# Patient Record
Sex: MALE
[De-identification: names, ages, dates, MRNs, and addresses within clinical notes are randomized per-mention and may not be internally consistent; named-entity substitution may affect disease eponyms.]

---

## 2017-05-09 ENCOUNTER — HOSPITAL ENCOUNTER (EMERGENCY)
Dept: HOSPITAL 14 - H.ER | Age: 57
Discharge: HOME | End: 2017-05-09
Payer: MEDICARE

## 2017-05-09 VITALS — OXYGEN SATURATION: 100 % | TEMPERATURE: 97 F

## 2017-05-09 VITALS — HEART RATE: 64 BPM | SYSTOLIC BLOOD PRESSURE: 124 MMHG | RESPIRATION RATE: 18 BRPM | DIASTOLIC BLOOD PRESSURE: 85 MMHG

## 2017-05-09 VITALS — BODY MASS INDEX: 26.9 KG/M2

## 2017-05-09 DIAGNOSIS — Z21: ICD-10-CM

## 2017-05-09 DIAGNOSIS — I10: ICD-10-CM

## 2017-05-09 DIAGNOSIS — N39.0: Primary | ICD-10-CM

## 2017-05-09 LAB
ALBUMIN/GLOB SERPL: 1.2 {RATIO} (ref 1–2.1)
ALP SERPL-CCNC: 83 U/L (ref 38–126)
ALT SERPL-CCNC: 26 U/L (ref 21–72)
AST SERPL-CCNC: 43 U/L (ref 17–59)
BASOPHILS # BLD AUTO: 0 K/UL (ref 0–0.2)
BASOPHILS NFR BLD: 0.7 % (ref 0–2)
BILIRUB SERPL-MCNC: 4.4 MG/DL (ref 0.2–1.3)
BILIRUB UR-MCNC: NEGATIVE MG/DL
BUN SERPL-MCNC: 9 MG/DL (ref 9–20)
CALCIUM SERPL-MCNC: 9.2 MG/DL (ref 8.4–10.2)
CHLORIDE SERPL-SCNC: 102 MMOL/L (ref 98–107)
CO2 SERPL-SCNC: 23 MMOL/L (ref 22–30)
COLOR UR: (no result)
EOSINOPHIL # BLD AUTO: 0.1 K/UL (ref 0–0.7)
EOSINOPHIL NFR BLD: 2.9 % (ref 0–4)
ERYTHROCYTE [DISTWIDTH] IN BLOOD BY AUTOMATED COUNT: 12.1 % (ref 11.5–14.5)
GLOBULIN SER-MCNC: 4 GM/DL (ref 2.2–3.9)
GLUCOSE SERPL-MCNC: 92 MG/DL (ref 75–110)
GLUCOSE UR STRIP-MCNC: (no result) MG/DL
HCT VFR BLD CALC: 41.1 % (ref 35–51)
KETONES UR STRIP-MCNC: NEGATIVE MG/DL
LEUKOCYTE ESTERASE UR-ACNC: (no result) LEU/UL
LYMPHOCYTES # BLD AUTO: 1 K/UL (ref 1–4.3)
LYMPHOCYTES NFR BLD AUTO: 31.8 % (ref 20–40)
MCH RBC QN AUTO: 33.2 PG (ref 27–31)
MCHC RBC AUTO-ENTMCNC: 33.9 G/DL (ref 33–37)
MCV RBC AUTO: 97.7 FL (ref 80–94)
MONOCYTES # BLD: 0.1 K/UL (ref 0–0.8)
MONOCYTES NFR BLD: 4.4 % (ref 0–10)
NEUTROPHILS # BLD: 1.9 K/UL (ref 1.8–7)
NEUTROPHILS NFR BLD AUTO: 60.2 % (ref 50–75)
NRBC BLD AUTO-RTO: 0.2 % (ref 0–0)
PH UR STRIP: 6 [PH] (ref 5–8)
PLATELET # BLD: 156 K/UL (ref 130–400)
PMV BLD AUTO: 9.8 FL (ref 7.2–11.7)
POTASSIUM SERPL-SCNC: 4.6 MMOL/L (ref 3.6–5)
PROT SERPL-MCNC: 8.9 G/DL (ref 6.3–8.2)
PROT UR STRIP-MCNC: NEGATIVE MG/DL
RBC # UR STRIP: NEGATIVE /UL
RBC #/AREA URNS HPF: 1 /HPF (ref 0–3)
SODIUM SERPL-SCNC: 139 MMOL/L (ref 132–148)
SP GR UR STRIP: < 1.005 (ref 1–1.03)
UROBILINOGEN UR-MCNC: (no result) MG/DL (ref 0.2–1)
WBC # BLD AUTO: 3.2 K/UL (ref 4.8–10.8)
WBC #/AREA URNS HPF: < 1 /HPF (ref 0–5)

## 2017-05-09 PROCEDURE — 80053 COMPREHEN METABOLIC PANEL: CPT

## 2017-05-09 PROCEDURE — 99283 EMERGENCY DEPT VISIT LOW MDM: CPT

## 2017-05-09 PROCEDURE — 81003 URINALYSIS AUTO W/O SCOPE: CPT

## 2017-05-09 PROCEDURE — 96360 HYDRATION IV INFUSION INIT: CPT

## 2017-05-09 PROCEDURE — 74177 CT ABD & PELVIS W/CONTRAST: CPT

## 2017-05-09 PROCEDURE — 87086 URINE CULTURE/COLONY COUNT: CPT

## 2017-05-09 PROCEDURE — 83605 ASSAY OF LACTIC ACID: CPT

## 2017-05-09 PROCEDURE — 87040 BLOOD CULTURE FOR BACTERIA: CPT

## 2017-05-09 PROCEDURE — 85025 COMPLETE CBC W/AUTO DIFF WBC: CPT

## 2017-05-23 ENCOUNTER — HOSPITAL ENCOUNTER (INPATIENT)
Dept: HOSPITAL 31 - C.ER | Age: 57
LOS: 3 days | Discharge: HOME | DRG: 341 | End: 2017-05-26
Attending: INTERNAL MEDICINE | Admitting: INTERNAL MEDICINE
Payer: MEDICARE

## 2017-05-23 VITALS — BODY MASS INDEX: 26.9 KG/M2

## 2017-05-23 DIAGNOSIS — E27.40: ICD-10-CM

## 2017-05-23 DIAGNOSIS — K35.80: Primary | ICD-10-CM

## 2017-05-23 DIAGNOSIS — B18.2: ICD-10-CM

## 2017-05-23 DIAGNOSIS — K21.9: ICD-10-CM

## 2017-05-23 DIAGNOSIS — B20: ICD-10-CM

## 2017-05-23 DIAGNOSIS — K66.0: ICD-10-CM

## 2017-05-23 DIAGNOSIS — B18.1: ICD-10-CM

## 2017-05-23 DIAGNOSIS — H30.893: ICD-10-CM

## 2017-05-23 DIAGNOSIS — B25.8: ICD-10-CM

## 2017-05-23 LAB
ALBUMIN/GLOB SERPL: 1.2 {RATIO} (ref 1–2.1)
ALP SERPL-CCNC: 126 U/L (ref 38–126)
ALT SERPL-CCNC: 25 U/L (ref 21–72)
APTT BLD: 32 SECONDS (ref 21–34)
AST SERPL-CCNC: 31 U/L (ref 17–59)
BASOPHILS # BLD AUTO: 0 K/UL (ref 0–0.2)
BASOPHILS NFR BLD: 0.7 % (ref 0–2)
BILIRUB SERPL-MCNC: 1.7 MG/DL (ref 0.2–1.3)
BILIRUB UR-MCNC: NEGATIVE MG/DL
BUN SERPL-MCNC: 13 MG/DL (ref 9–20)
CALCIUM SERPL-MCNC: 8.6 MG/DL (ref 8.6–10.4)
CHLORIDE SERPL-SCNC: 103 MMOL/L (ref 98–107)
CO2 SERPL-SCNC: 26 MMOL/L (ref 22–30)
EOSINOPHIL # BLD AUTO: 0.1 K/UL (ref 0–0.7)
EOSINOPHIL NFR BLD: 2.7 % (ref 0–4)
ERYTHROCYTE [DISTWIDTH] IN BLOOD BY AUTOMATED COUNT: 12 % (ref 11.5–14.5)
GLOBULIN SER-MCNC: 3.5 GM/DL (ref 2.2–3.9)
GLUCOSE SERPL-MCNC: 109 MG/DL (ref 75–110)
GLUCOSE UR STRIP-MCNC: NORMAL MG/DL
HCT VFR BLD CALC: 36.3 % (ref 35–51)
INR PPP: 1.1
KETONES UR STRIP-MCNC: NEGATIVE MG/DL
LEUKOCYTE ESTERASE UR-ACNC: (no result) LEU/UL
LYMPHOCYTES # BLD AUTO: 1.2 K/UL (ref 1–4.3)
LYMPHOCYTES NFR BLD AUTO: 34.6 % (ref 20–40)
MCH RBC QN AUTO: 33.6 PG (ref 27–31)
MCHC RBC AUTO-ENTMCNC: 34.7 G/DL (ref 33–37)
MCV RBC AUTO: 96.7 FL (ref 80–94)
MONOCYTES # BLD: 0.1 K/UL (ref 0–0.8)
MONOCYTES NFR BLD: 3.1 % (ref 0–10)
NRBC BLD AUTO-RTO: 0 % (ref 0–2)
PH UR STRIP: 5 [PH] (ref 5–8)
PLATELET # BLD: 155 K/UL (ref 130–400)
PMV BLD AUTO: 9.6 FL (ref 7.2–11.7)
POTASSIUM SERPL-SCNC: 3.6 MMOL/L (ref 3.6–5.2)
PROT SERPL-MCNC: 7.7 G/DL (ref 6.3–8.3)
PROT UR STRIP-MCNC: NEGATIVE MG/DL
RBC # UR STRIP: NEGATIVE /UL
SODIUM SERPL-SCNC: 141 MMOL/L (ref 132–148)
SP GR UR STRIP: 1.01 (ref 1–1.03)
UROBILINOGEN UR-MCNC: NORMAL MG/DL (ref 0.2–1)
WBC # BLD AUTO: 3.6 K/UL (ref 4.8–10.8)
WBC #/AREA URNS HPF: < 1 /HPF (ref 0–5)

## 2017-05-23 NOTE — C.PDOC
History Of Present Illness





A 57 y/o male presents to the ER c/o RLQ pain for 2 weeks. Pt notes the pain 

has been worse over the past few days. Pt was seen at [facility] about 2 

weeks prior and was given Cipro. Pt denies fever, chills, nausea, vomiting, 

trauma to the area, constipation, or any other complaints.





Time Seen by Provider: 05/23/17 19:33


Chief Complaint (Nursing): Abdominal Pain


History Per: Patient


History/Exam Limitations: no limitations


Onset/Duration Of Symptoms: Days


Current Symptoms Are (Timing): Still Present


Context: Other


Severity: Mild


Location Of Pain/Discomfort: RLQ


Radiation Of Pain To:: None


Quality Of Discomfort: Dull, Cramping, Stabbing


Associated Symptoms: denies: Fever, Chills, Nausea, Vomiting, Diarrhea


Exacerbating Factors: None


Alleviating Factors: None


Recent travel outside of the [place]: No


Additional History Per: Patient





Past Medical History


Reviewed: Historical Data, Nursing Documentation, Vital Signs


Vital Signs: 


 Last Vital Signs











Temp  97.9 F   05/23/17 20:11


 


Pulse  67   05/23/17 20:11


 


Resp  16   05/23/17 20:11


 


BP  120/76   05/23/17 20:11


 


Pulse Ox  98   05/23/17 21:53














- Medical History


PMH: Gastritis (EGD 4/16 showed Candida, GERD and Gastritis), Gall Bladder 

Disease, HIV


   Denies: Crohn's Disease, Diverticulitis, Pancreatitis, Chronic Kidney Disease


Surgical History: Cholecystectomy, Endoscopy





- [facility] Procedures








COLONOSCOPY (12/03/14)


DRAINAGE OF RIGHT LOWER LOBE BRONCHUS, ENDO, DIAGN (08/03/16)








Family History: States: Unknown Family Hx





- Social History


Hx Tobacco Use: No


Hx Alcohol Use: No


Hx Substance Use: No





- Immunization History


Hx Tetanus Toxoid Vaccination: Yes


Hx Influenza Vaccination: Yes


Hx Pneumococcal Vaccination: Yes





Review Of Systems


Constitutional: Negative for: Fever, Chills, Other ([place])


Eyes: Negative for: Redness


Cardiovascular: Negative for: Chest Pain


Respiratory: Negative for: Shortness of Breath


Gastrointestinal: Positive for: Abdominal Pain (RLQ 2 weeks).  Negative for: 

Nausea, Vomiting, Diarrhea, Constipation


Genitourinary: Positive for: Dysuria, Frequency


Musculoskeletal: Negative for: Back Pain


Skin: Negative for: Rash, Lesions, Jaundice, Bruising


Neurological: Negative for: Weakness


Psych: Negative for: Anxiety





Physical Exam





- Physical Exam


Appears: Non-toxic, No Acute Distress


Skin: Warm, Dry


Head: Normacephalic


Eye(s): bilateral: Normal Inspection


Oral Mucosa: Moist


Neck: Trachea Midline, Supple


Chest: Symmetrical


Cardiovascular: Rhythm Regular


Respiratory: No Rales, No Rhonchi, No Wheezing


Gastrointestinal/Abdominal: Soft, Tenderness (RLQ tenderness), No Guarding, No 

Rebound


Back: Normal Inspection


Male Genital: No Testicular Tenderness, No Testicular Swelling, No Inguinal 

Tenderness, No Inguinal Swelling, Circumcised, Other (Inguinal canal normal)


Extremity: Normal ROM


Extremity: Bilateral: Atraumatic, Normal Color And Temperature, Normal ROM


Neurological/Psych: Oriented x3, Normal Speech, Normal Cognition, Other (No 

focal deficit)


Gait: Steady





ED Course And Treatment





- Laboratory Results


Result Diagrams: 


 05/23/17 20:06





 05/23/17 20:06


O2 Sat by Pulse Oximetry: 98 (RA)


Pulse Ox Interpretation: Normal





- CT Scan/US


  ** CT Abd/Pel


Other Rad Studies (CT/US): Interpreted By Me, Read By Radiologist


CT/US Interpretation: EXAM:  CT Abdomen and Pelvis With Intravenous Contrast.  

CLINICAL HISTORY:  56 years old, male; Pain; Abdominal pain; Localized; Right 

lower quadrant (rlq); Additional info: Rlq.  pain.  TECHNIQUE:  Axial computed 

tomography images of the abdomen and pelvis with intravenous contrast. This CT.

  exam was performed using one or more of the following dose reduction 

techniques: automated.  exposure control, adjustment of the mA and/or kV 

according to patient size, and/or use of iterative.  reconstruction technique.  

Coronal and sagittal reformatted images were created and reviewed.  CONTRAST:  

100 mL of omnipaque 300 administered intravenously.  EXAM DATE/TIME:  Exam 

ordered 5/23/2017 7:39 PM.  COMPARISON:  US - PELVIS ULTRASOUND 1/5/2016 2:22:

56 PM.  FINDINGS:  Lower thorax: Coarse reticular pleural based densities are 

noted in the right middle lobe.  ABDOMEN:  Liver: A clip is noted adjacent to 

the posterior margin of the liver.  Gallbladder and bile ducts: Surgical clips 

are noted in the gallbladder fossa. The gallbladder is.  absent. No ductal 

dilation.  Pancreas: Unremarkable. No mass. No ductal dilation.  Spleen: 

Unremarkable. No splenomegaly.  Adrenals: Unremarkable. No mass.  Kidneys and 

ureters: Unremarkable. No solid mass. No hydronephrosis.  Stomach and bowel: 

Unremarkable. No obstruction. No mucosal thickening.  Appendix: There is mild 

thickening of the tip of the appendix with minimal periappendiceal.  

inflammatory change.  PELVIS:  Bladder: Unremarkable. No mass.  Reproductive: 

Prostate measures 4.1 x 4.7 x 4.4 cm.  ABDOMEN and PELVIS:  Intraperitoneal 

space: Unremarkable. No free air. No significant fluid collection.  Bones/joints

: Degenerative changes are noted of the lumbar spine there is a grade 1.  

spondylolisthesis at the lumbosacral junction with 4 mm of anterolisthesis of 

S1 with respect to L5.  There is straightening of the normal lumbar lordosis. 

No acute fracture. No dislocation.  Soft tissues: Unremarkable.  Vasculature: 

Unremarkable. No abdominal aortic aneurysm.  Lymph nodes: Unremarkable. No 

enlarged lymph nodes.  IMPRESSION:  1. Appendicitis. No abscess. No perforation.


Progress Note: spoke with dr hernandez. for surgery in am





Disposition


Discussed With .: Darius Travis


Comment: accepted the pt on his service and took over the care at 9:46 PM


Doctor Will See Patient In The: Hospital


Counseled Patient/Family Regarding: Studies Performed, Diagnosis





- Disposition


Disposition: HOSPITALIZED


Disposition Time: 19:33


Condition: FAIR





- POA


Present On Arrival: None





- Clinical Impression


Clinical Impression: 


 Abdominal pain, Appendicitis








- Scribe Statement


The provider has reviewed the documentation as recorded by the Scribe





Nikos porter





All medical record entries made by the Clarisaibtrevin were at my direction and 

personally dictated by me. I have reviewed the chart and agree that the record 

accurately reflects my personal performance of the history, physical exam, 

medical decision making, and the department course for this patient. I have 

also personally directed, reviewed, and agree with the discharge instructions 

and disposition.





Decision To Admit





- Pt Status Changed To:


Hospital Disposition Of: Inpatient





- Admit Certification


Admit to Inpatient:: After my assessment, the patient will require 

hospitalization for at least two midnights.  This is because of the severity of 

symptoms shown, intensity of services needed, and/or the medical risk in this 

patient being treated as an outpatient.





- InPatient:


Physician Admission Certification:: After my assessment, the patient will 

require hospitalization for at least two midnights.  This is because of the 

severity of symptoms shown, intensity of services needed, and/or the medical 

risk in this patient being treated as an outpatient.





- .


Bed Request Type: Regular


Admitting Physician: Darius Travis (')


Patient Diagnosis: 


 Abdominal pain, Appendicitis

## 2017-05-23 NOTE — CP.PCM.HP
History of Present Illness





- History of Present Illness


History of Present Illness: 





COMPREHENSIVE   HISTORY & PHYSICAL EXAM 


   


HPI


FOR FEW DAYS C/O OF R. LQ PAIN A/W FEVER CHILLS AND NAUSEA. WAS FIRST EVALUATED 

IN ER [facility] AND SENT HOME . PAIN PESISTED AND REPAET CT ABD SHOWED SWOLLEN 

ROSARIO APPENDIX AND INFLAMMATION 





PAST HIST.


HIV/CMV RETINITIS/ADRENAL FAILURE /HTN /


PERSONAL HIST:   Smoking.   N   Alcohol.   N      Allergy N            Travel_-

      .


FAMILY HIST : 


ROS :


Constitutional: Negative for weight change, chills, night sweats, fatigue and 

usage of assist device. 


  Eyes: Negative for redness, swelling, itching, discharge, vision changes, 

blurry vision, double vision, glaucoma, cataracts, 


  Ears: Negative for hearing loss, ringing, , tinnitus, vertigo


 Nose: Negative for rhinorrhea, stuffiness, sniffing, itching, postnasal drip, 

discoloration, nasal congestion and epistaxis. 


 Throat: Negative for throat clearing, sore throat, hoarseness, difficulty 

swallowing and difficulty speaking. 


  Respiratory: Negative for cough, chest tightness, sputum or phlegm, chronic 

cough, hemoptysis, wheezing, snoring at night, pleuritic chest pain and daytime 

somnolence. 


 Cardiovascular: Negative for chest pain, palpitations, orthopnea, PND, Edema 

of legs, leg cramps, angina, claudication, , irregular heartbeat,


 Neurology: Negative for irritability, muscle weakness, numbness and tingling, 

seizures, tremors, migraines, slurred speech, syncope, memory loss, mood changes

, recurrent headaches 


Gastrointestinal: Negative for difficulty swallowing, diarrhea, constipation, 

black stools, rectal bleeding, , flatulence, reflux POS  RLQ PAIN  AND NUSEA 


  Genitourinary: Negative for frequent urination, hematuria, discharge, 

incontinence, urinary retention, frequent UTI, Psychiatric: Negative for 

depression, anxiety/panic, suicidal tendencies, 


Musculoskeletal: Negative for swollen joints, back pain, , neck pain, morning 

stiffness of joints, . 


 Skin: Negative for rash, ulcers, itching, dry skin and pigmented lesions.


P/E: 


  Constitutional: Appears stated age and in no apparent distress. 


 Head: Normocephalic. 


  Ears: External ear canals patent without inflammation. Tympanic membranes 

intact with normal light reflex and landmark. 


  Eyes: Pupils are central, bilaterally equal, symmetrical and reacts to light 

with normal movements and no icterus or pallor. 


 Nose: External nares are patent. Mucosa is pink  Mouth-Throat: Good general 

appearance and condition. No post-pharyngeal/oropharyngeal erythema and 

tonsillar hypertrophy. Good dental hygiene. 


  Neck-Lymphatic: Neck is supple with normal ROM, no thyromegaly, lymph nodes 

or masses. JVD is normal with no carotid bruit. 


  Lungs: Clear to percussion and auscultation with bilateral normal air entry. 


  Cardiovascular: S1 and S2 are normal with no murmurs, gallops and rub. 


  GI Exam: No hepatomegaly. Abdomen is soft and   RLQ tender. No Organomegaly , 

masses or hernias are evident and bowel sounds are normal and active. 


  Neurology: Higher function and all cranial nerves intact, with no gross motor 

or sensory deficit. Superficial and deep reflexes are normal with downwards 

planters. No cerebellar deficit with normal gait. 


  Musculoskeletal: No tender spots with normal curvature of the spine with no 

swelling or restricted ROM of the small and large joints. 


  Extremities: Homans sign absent. Intact pulses with no pitting edema, calf 

tenderness or skin color changes. 


  Skin: No rash, eruptions or abnormal skin pigmentation





LAB/RADIOLOGY:


ASSESMENT :


ACUTE APPENDICITIES 


HIV 


H/O  CMV RETINITIS 


ADRENAL FAILURE 





PLAN: 


SURGICAL/ID EVAL 








Present on Admission





- Present on Admission


Any Indicators Present on Admission: No





Past Patient History





- Infectious Disease


Hx of Infectious Diseases: None





- Past Medical History & Family History


Past Medical History?: Yes





- Past Social History


Smoking Status: Never Smoked





- CARDIAC


Hx Cardiac Disorders: No





- PULMONARY


Hx Respiratory Disorders: No





- NEUROLOGICAL


Hx Neurological Disorder: No





- HEENT


Hx HEENT Problems: No





- RENAL


Hx Chronic Kidney Disease: No





- ENDOCRINE/METABOLIC


Hx Endocrine Disorders: No





- HEMATOLOGICAL/ONCOLOGICAL


Hx Human Immunodeficiency Virus (HIV): Yes





- INTEGUMENTARY


Hx Dermatological Problems: No





- MUSCULOSKELETAL/RHEUMATOLOGICAL


Hx Musculoskeletal Disorders: No





- GASTROINTESTINAL


Hx Crohn's Disease: No


Hx Diverticulitis: No


Hx Gall Bladder Disease: Yes


Hx Gastritis: Yes (EGD 4/16 showed Candida, GERD and Gastritis)


Hx Pancreatitis: No





- GENITOURINARY/GYNECOLOGICAL


Hx Genitourinary Disorders: No





- PSYCHIATRIC


Hx Substance Use: No





- SURGICAL HISTORY


Hx Cholecystectomy: Yes





- ANESTHESIA


Hx Anesthesia: Yes


Hx Anesthesia Reactions: No


Hx Malignant Hyperthermia: No





Meds


Allergies/Adverse Reactions: 


 Allergies











Allergy/AdvReac Type Severity Reaction Status Date / Time


 


No Known Allergies Allergy   Verified 05/23/17 18:31














Results





- Vital Signs


Recent Vital Signs: 





 Last Vital Signs











Temp  98 F   05/23/17 22:56


 


Pulse  66   05/23/17 22:56


 


Resp  20   05/23/17 22:56


 


BP  148/84   05/23/17 22:56


 


Pulse Ox  97   05/23/17 22:56














- Labs


Result Diagrams: 


 05/23/17 20:06





 05/23/17 20:06

## 2017-05-24 PROCEDURE — 0DTJ0ZZ RESECTION OF APPENDIX, OPEN APPROACH: ICD-10-PCS

## 2017-05-24 RX ADMIN — OXYCODONE HYDROCHLORIDE AND ACETAMINOPHEN PRN TAB: 5; 325 TABLET ORAL at 23:50

## 2017-05-24 RX ADMIN — SODIUM CHLORIDE SCH MLS/HR: 9 INJECTION, SOLUTION INTRAVENOUS at 13:11

## 2017-05-24 RX ADMIN — SODIUM CHLORIDE SCH MLS/HR: 9 INJECTION, SOLUTION INTRAVENOUS at 21:18

## 2017-05-24 RX ADMIN — LOPINAVIR AND RITONAVIR SCH: 200; 50 TABLET, FILM COATED ORAL at 09:53

## 2017-05-24 RX ADMIN — EMTRICITABINE AND TENOFOVIR DISOPROXIL FUMARATE SCH TAB: 200; 300 TABLET, FILM COATED ORAL at 09:51

## 2017-05-24 RX ADMIN — LOPINAVIR AND RITONAVIR SCH: 200; 50 TABLET, FILM COATED ORAL at 20:49

## 2017-05-24 RX ADMIN — POTASSIUM CHLORIDE, DEXTROSE MONOHYDRATE AND SODIUM CHLORIDE SCH MLS/HR: 150; 5; 450 INJECTION, SOLUTION INTRAVENOUS at 20:47

## 2017-05-24 RX ADMIN — SODIUM CHLORIDE SCH MLS/HR: 9 INJECTION, SOLUTION INTRAVENOUS at 05:27

## 2017-05-24 NOTE — CP.PCM.CON
History of Present Illness





- History of Present Illness


History of Present Illness: 


INFECTIOUS DISEASE CONSULT.


HPI;


56-year-old male with multiple medical problems including HIV positive, ON 

HAART therapy with undetectable viral load, CMV retinitis, adreneocortical 

insufficiency, on hydrocortisone supplement, hepatitis B, and chronic hepatitis 

C with undetectable HCV RNA who was seen in my office last evening and 

suspected for acute appendicitis and sent to the ER for evaluation.


Patient has been having persistent right lowrer quadrant pain for about 2 

weeks.  Patient was in [facility] on 5/9/17 and was given by mouth 

Cipro only to return if symptoms don't improve.  Patient was also complaining 

of dysuria. PATIENT ALSO ADMITS TO FEVER OF UP  AT HOME 


In the ER patient had a CT of the abdomen and pelvis which revealed inflamed 

swollen appendix and admitted for acute appendicitis.





Infectious disease consultation requested PMD for FEVERS and acute appendicitis.


PATIENT DENIES ANY COUGH SHORTNESS OF BREATH.  DENIES ANY CHEST PAINS.


DENIES VOMITING OR DIARRHEA. STATES HE STILL HAS SOME DYSURIA AND LOW BACK PAIN 

AND RIGHT LOWER QUADRANT PAIN. 


 PATIENT DENIES ANY HEMATURIA.





PMH ; HIV, CMV-RETINITIS, ADRENOCORTICAL INSUFFICIENCY, HERPES GENITALIS,


         PNEUMONIA ?PCP (2016 ),HEP B/HEP C +VE.


ALLERGIES; NKA.


PSH; CHOLECYSTECTOMY.


FH ; NON CONTRIBUTARY.


MEDS; REVIEWED. SEE MARS.























Review of Systems





- Constitutional


Constitutional: Chills, Fever





- EENT


Eyes: absent: Change in Vision, Other Visual Disturbances


Nose/Mouth/Throat: absent: Dysphagia, Mouth Lesions





- Cardiovascular


Cardiovascular: absent: Chest Pain, Dyspnea





- Respiratory


Respiratory: absent: Cough, Hemoptysis





- Gastrointestinal


Gastrointestinal: Abdominal Pain (RIGHT LOW QUADRANT GOING INTO THE GROIN.), 

Nausea.  absent: Diarrhea, Vomiting





- Genitourinary


Genitourinary: Dysuria, Urinary Hesitance





- Reproductive: Male


Reproductive:Male: Pelvic Pain





- Neurological


Neurological: absent: Dizziness, Headaches





- Hematologic/Lymphatic


Hematologic: As Per HPI.  absent: Easy Bleeding, Easy Bruising, Lymphadenopathy





Past Patient History





- Infectious Disease


Hx of Infectious Diseases: None





- Past Medical History & Family History


Past Medical History?: Yes





- Past Social History


Smoking Status: Never Smoked





- CARDIAC


Hx Cardiac Disorders: No





- PULMONARY


Hx Respiratory Disorders: No





- NEUROLOGICAL


Hx Neurological Disorder: No





- HEENT


Hx HEENT Problems: No





- RENAL


Hx Chronic Kidney Disease: No





- ENDOCRINE/METABOLIC


Hx Endocrine Disorders: No





- HEMATOLOGICAL/ONCOLOGICAL


Hx Human Immunodeficiency Virus (HIV): Yes





- INTEGUMENTARY


Hx Dermatological Problems: No





- MUSCULOSKELETAL/RHEUMATOLOGICAL


Hx Musculoskeletal Disorders: No


Hx Falls: No





- GASTROINTESTINAL


Hx Crohn's Disease: No


Hx Diverticulitis: No


Hx Gall Bladder Disease: Yes


Hx Gastritis: Yes (EGD 4/16 showed Candida, GERD and Gastritis)


Hx Pancreatitis: No





- GENITOURINARY/GYNECOLOGICAL


Hx Genitourinary Disorders: No





- PSYCHIATRIC


Hx Substance Use: No





- SURGICAL HISTORY


Hx Cholecystectomy: Yes





- ANESTHESIA


Hx Anesthesia: Yes


Hx Anesthesia Reactions: No


Hx Malignant Hyperthermia: No


Has any member of the family had a problem w/ anesthesia?: No





Meds


Allergies/Adverse Reactions: 


 Allergies











Allergy/AdvReac Type Severity Reaction Status Date / Time


 


No Known Allergies Allergy   Verified 05/23/17 18:31














- Medications


Medications: 


 Current Medications





Emtricitabine/Tenofovir (Truvada 200 Mg-300 Mg)  1 tab PO DAILY [facility]


   Last Admin: 05/24/17 09:51 Dose:  1 tab


Hydrocortisone (Cortef)  10 mg PO BID [facility]


   Last Admin: 05/24/17 09:50 Dose:  10 mg


Piperacillin Sod/Tazobactam (Sod 3.375 gm/ Sodium Chloride)  100 mls @ 200 mls/

hr IVPB Q8H [facility]


   Last Admin: 05/24/17 13:11 Dose:  200 mls/hr


Lopinavir/Ritonavir (Kaletra 200-50 Mg)  2 cap PO BID [facility]


   Last Admin: 05/24/17 09:53 Dose:  Not Given


Valganciclovir (Valcyte)  450 mg PO DAILY [facility]


   Last Admin: 05/24/17 11:12 Dose:  450 mg











Physical Exam





- Head Exam


Head Exam: NORMAL INSPECTION





- Eye Exam


Eye Exam: EOMI, PERRL





- ENT Exam


ENT Exam: Normal Oropharynx





- Neck Exam


Neck exam: Positive for: Normal Inspection





- Respiratory Exam


Respiratory Exam: Clear to Auscultation Bilateral, NORMAL BREATHING PATTERN





- Cardiovascular Exam


Cardiovascular Exam: REGULAR RHYTHM, +S1, +S2





- GI/Abdominal Exam


GI & Abdominal Exam: Hypoactive Bowel Sounds, Soft, Tenderness (RIGHT LOW 

QUADRANT mCbURNEY'S POINT.)





- Extremities Exam


Extremities exam: Positive for: pedal pulses present.  Negative for: calf 

tenderness, pedal edema





- Neurological Exam


Neurological exam: Alert, CN II-XII Intact, Oriented x3, Reflexes Normal





- Psychiatric Exam


Psychiatric exam: Normal Mood





- Skin


Skin Exam: Normal Color, Warm





Results





- Vital Signs


Recent Vital Signs: 


 Last Vital Signs











Temp  98.1 F   05/24/17 08:27


 


Pulse  61   05/24/17 08:27


 


Resp  20   05/24/17 08:27


 


BP  114/76   05/24/17 08:27


 


Pulse Ox  96   05/24/17 08:27














- Labs


Result Diagrams: 


 05/23/17 20:06





 05/23/17 20:06





- Imaging and Cardiology


  ** CT scan - abdomen


Status: Report reviewed by me (SEE REPORT.)





Assessment & Plan


(1) Abdominal pain


Status: Acute   





(2) Appendicitis


Status: Acute   





(3) Adrenal insufficiency


Status: Chronic   





(4) CMV retinitis


Status: Chronic   





(5) Chronic hepatitis B virus infection


Status: Chronic   Priority: Low   





(6) HIV (human immunodeficiency virus infection)


Status: Chronic   Priority: High   





- Assessment and Plan (Free Text)


Plan: 





PLAN





PANCULTURES


NPO.


IV FLUIDS AS PER PMD .


SURGICAL CONSULT IN PROGRESS.


CONTINUE iv ZOSYN 3.375 EVERY 8 HOURLY


HYDROCORTISONE 10 MG BY MOUTH TWICE A DAY


kALETRA 200/50 2 TABLETS TWICE A DAY.


TRUVADA 1 TABLET BY MOUTH ONCE DAILY..


VALACYTE   450 MG PO 2 TABLETS ONCE A DAY..


FOLLOW-UP CULTURES TO ADJUST ANTIBIOTICS

## 2017-05-24 NOTE — CP.PCM.PN
Subjective





- Date & Time of Evaluation


Date of Evaluation: 05/24/17


Time of Evaluation: 13:51





- Subjective


Subjective: 





PT IN OR FOR SURGERY 


VS STABLE AS PER CHART 


WILL F/U AFTER OR 





Objective





- Vital Signs/Intake and Output


Vital Signs (last 24 hours): 


 











Temp Pulse Resp BP Pulse Ox


 


 98.0 F   67   20   140/85   98 


 


 05/24/17 13:24  05/24/17 13:24  05/24/17 13:24  05/24/17 13:24  05/24/17 13:24








Intake and Output: 


 











 05/24/17 05/24/17





 11:59 23:59


 


Intake Total 100 


 


Balance 100 














- Medications


Medications: 


 Current Medications





Emtricitabine/Tenofovir (Truvada 200 Mg-300 Mg)  1 tab PO DAILY [facility]


   Last Admin: 05/24/17 09:51 Dose:  1 tab


Hydrocortisone (Cortef)  10 mg PO BID [facility]


   Last Admin: 05/24/17 09:50 Dose:  10 mg


Piperacillin Sod/Tazobactam (Sod 3.375 gm/ Sodium Chloride)  100 mls @ 200 mls/

hr IVPB Q8H [facility]


   Last Admin: 05/24/17 13:11 Dose:  200 mls/hr


Lopinavir/Ritonavir (Kaletra 200-50 Mg)  2 cap PO BID [facility]


   Last Admin: 05/24/17 09:53 Dose:  Not Given


Valganciclovir (Valcyte)  450 mg PO DAILY [facility]


   Last Admin: 05/24/17 11:12 Dose:  450 mg











- Labs


Labs: 


 











PT  12.7 SECONDS (9.7-12.2)  H  05/23/17  20:06    


 


INR  1.1   05/23/17  20:06    


 


APTT  32 SECONDS (21-34)   05/23/17  20:06

## 2017-05-24 NOTE — OP
PROCEDURE DATE: 05/24/2017



INDICATIONS FOR SURGERY:  This is a 56-year-old male who presented with signs and symptoms of acute a
ppendicitis which was confirmed by CAT scan, now taken to the OR for urgent appendectomy.



GROSS FINDINGS:  The appendix was acutely inflamed.  It was surrounded by a phlegmon of both small an
d large bowel, where adhesions had to be taken down carefully prior to delivering the appendix.  Ther
e were no other abnormal findings.



PROCEDURE:  The patient taken to the operating room and placed in supine position.  General anesthesi
a was administered.  The abdomen was prepped and draped.  The abdomen was entered through a transvers
e muscle splitting incision in the right lower quadrant.  The appendix was found to be associated wit
h a phlegmon and was delivered into the wound after the adhesions were taken down digitally and caref
ully.  The mesoappendix was divided between clamps with Vicryl ties.  The base of the appendix was di
vided with a TA 30 stapler.  A mesenteric blood vessel was also repaired with Prolene.  The small bow
el was inspected and there was noted to be a serosal tear of the cecum as well as of the small bowel,
 which was repaired with silk.  Wound was then irrigated with copious amounts of saline solution and 
the incision was closed in layers with Monocryl, subcuticular Monocryl and skin clips.  The patient t
olerated procedure well, returned to recovery room in stable condition.





__________________________________________

Shade Wilson MD







cc:



DD: 05/24/2017 15:14:00  [id number]

TT: 05/24/2017 19:32:16

Job # [id number]

jn

## 2017-05-25 VITALS — RESPIRATION RATE: 20 BRPM

## 2017-05-25 RX ADMIN — EMTRICITABINE AND TENOFOVIR DISOPROXIL FUMARATE SCH TAB: 200; 300 TABLET, FILM COATED ORAL at 09:48

## 2017-05-25 RX ADMIN — POTASSIUM CHLORIDE, DEXTROSE MONOHYDRATE AND SODIUM CHLORIDE SCH MLS/HR: 150; 5; 450 INJECTION, SOLUTION INTRAVENOUS at 13:50

## 2017-05-25 RX ADMIN — OXYCODONE HYDROCHLORIDE AND ACETAMINOPHEN PRN TAB: 5; 325 TABLET ORAL at 09:46

## 2017-05-25 RX ADMIN — POTASSIUM CHLORIDE, DEXTROSE MONOHYDRATE AND SODIUM CHLORIDE SCH: 150; 5; 450 INJECTION, SOLUTION INTRAVENOUS at 04:00

## 2017-05-25 RX ADMIN — SODIUM CHLORIDE SCH MLS/HR: 9 INJECTION, SOLUTION INTRAVENOUS at 21:04

## 2017-05-25 RX ADMIN — LOPINAVIR AND RITONAVIR SCH CAP: 200; 50 TABLET, FILM COATED ORAL at 17:09

## 2017-05-25 RX ADMIN — SODIUM CHLORIDE SCH MLS/HR: 9 INJECTION, SOLUTION INTRAVENOUS at 05:34

## 2017-05-25 RX ADMIN — LOPINAVIR AND RITONAVIR SCH CAP: 200; 50 TABLET, FILM COATED ORAL at 09:48

## 2017-05-25 RX ADMIN — OXYCODONE HYDROCHLORIDE AND ACETAMINOPHEN PRN TAB: 5; 325 TABLET ORAL at 05:56

## 2017-05-25 RX ADMIN — SODIUM CHLORIDE SCH MLS/HR: 9 INJECTION, SOLUTION INTRAVENOUS at 13:49

## 2017-05-25 NOTE — CP.PCM.PN
Subjective





- Date & Time of Evaluation


Date of Evaluation: 05/25/17


Time of Evaluation: 13:48





- Subjective


Subjective: 


 POD# 1


afebrile BP LOW.


FEELS WEAK


C/O POST-OPERATIVE SITE PAIN


STILL C/O NAUSEA/AND DIZZINESS


STATES NOT PASSING FLATUS





 





Objective





- Vital Signs/Intake and Output


Vital Signs (last 24 hours): 


 











Temp Pulse Resp BP Pulse Ox


 


 98.2 F   82   18   98/62 L  94 L


 


 05/25/17 07:30  05/25/17 07:30  05/25/17 07:30  05/25/17 07:30  05/25/17 07:30








Intake and Output: 


 











 05/25/17 05/25/17





 06:59 18:59


 


Intake Total 1790 


 


Output Total 350 


 


Balance 1440 














- Medications


Medications: 


 Current Medications





Emtricitabine/Tenofovir (Truvada 200 Mg-300 Mg)  1 tab PO DAILY [facility]


   Last Admin: 05/25/17 09:48 Dose:  1 tab


Hydrocortisone (Cortef)  10 mg PO BID [facility]


   Last Admin: 05/25/17 09:49 Dose:  10 mg


Piperacillin Sod/Tazobactam (Sod 3.375 gm/ Sodium Chloride)  100 mls @ 200 mls/

hr IVPB Q8H [facility]


   Last Admin: 05/25/17 05:34 Dose:  200 mls/hr


Potassium Chloride/Dextrose/Sod Cl (Potassium Chl 20 Meq In D5-1/2ns)  1,000 

mls @ 80 mls/hr IV .[id number] [facility]


   Last Admin: 05/25/17 04:00 Dose:  Not Given


Lopinavir/Ritonavir (Kaletra 200-50 Mg)  2 cap PO BID [facility]


   Last Admin: 05/25/17 09:48 Dose:  2 cap


Oxycodone/Acetaminophen (Percocet 5/325 Mg Tab)  2 tab PO Q4H PRN


   PRN Reason: pain 1-8


   Stop: 05/27/17 15:07


   Last Admin: 05/25/17 09:46 Dose:  2 tab


Valganciclovir (Valcyte)  450 mg PO DAILY [facility]


   Last Admin: 05/25/17 09:49 Dose:  450 mg











- Labs


Labs: 


 











PT  12.7 SECONDS (9.7-12.2)  H  05/23/17  20:06    


 


INR  1.1   05/23/17  20:06    


 


APTT  32 SECONDS (21-34)   05/23/17  20:06    














- Constitutional


Appears: No Acute Distress





- Head Exam


Head Exam: NORMAL INSPECTION





- Eye Exam


Eye Exam: EOMI, PERRL





- ENT Exam


ENT Exam: Normal Oropharynx





- Neck Exam


Neck Exam: Normal Inspection





- Respiratory Exam


Respiratory Exam: Clear to Ausculation Bilateral





- Cardiovascular Exam


Cardiovascular Exam: REGULAR RHYTHM, +S1, +S2





- GI/Abdominal Exam


GI & Abdominal Exam: Soft, Tenderness (+VE POST OPERATIVE SITE.), Hypoactive 

Bowel Sounds (RLQ STAPLES IN PLACE.)





- Extremities Exam


Extremities Exam: Normal Capillary Refill.  absent: Calf Tenderness, Pedal Edema

, Tenderness





- Neurological Exam


Neurological Exam: Awake, CN II-XII Intact, Normal Gait, Oriented x3





- Psychiatric Exam


Psychiatric exam: Normal Mood





- Skin


Skin Exam: Normal Color, Warm





Assessment and Plan


(1) Abdominal pain


Status: Acute   





(2) Appendicitis


Status: Acute   





(3) Adrenal insufficiency


Status: Chronic   





(4) CMV retinitis


Status: Chronic   





(5) Chronic hepatitis B virus infection


Status: Chronic   





(6) HIV (human immunodeficiency virus infection)


Status: Chronic   





- Assessment and Plan (Free Text)


Plan: 





CONTINUE iv ZOSYN 3.375 EVERY 8 HOURLY DAY2


HOLD DC AS PT. FEELS WEAK AND C/O PELVIC PAIN AND CONSTPATION


START PO COLACE 100MG  BID.





HYDROCORTISONE 10 MG BY MOUTH TWICE A DAY


kALETRA 200/50 2 TABLETS TWICE A DAY.


TRUVADA 1 TABLET BY MOUTH ONCE DAILY..


VALACYTE   450 MG PO 2 TABLETS ONCE A DAY.

## 2017-05-25 NOTE — CP.PCM.PN
Subjective





- Date & Time of Evaluation


Date of Evaluation: 05/25/17


Time of Evaluation: 14:15





- Subjective


Subjective: 





POST OP PAIN 


AFEBRILE 


CONT PRESENT MANAGEMENT 





Objective





- Vital Signs/Intake and Output


Vital Signs (last 24 hours): 


 











Temp Pulse Resp BP Pulse Ox


 


 98.2 F   82   18   98/62 L  94 L


 


 05/25/17 07:30  05/25/17 07:30  05/25/17 07:30  05/25/17 07:30  05/25/17 07:30








Intake and Output: 


 











 05/25/17 05/25/17





 11:59 23:59


 


Intake Total 890 


 


Balance 890 














- Medications


Medications: 


 Current Medications





Emtricitabine/Tenofovir (Truvada 200 Mg-300 Mg)  1 tab PO DAILY [facility]


   Last Admin: 05/25/17 09:48 Dose:  1 tab


Hydrocortisone (Cortef)  10 mg PO BID [facility]


   Last Admin: 05/25/17 09:49 Dose:  10 mg


Piperacillin Sod/Tazobactam (Sod 3.375 gm/ Sodium Chloride)  100 mls @ 200 mls/

hr IVPB Q8H [facility]


   Last Admin: 05/25/17 13:49 Dose:  200 mls/hr


Potassium Chloride/Dextrose/Sod Cl (Potassium Chl 20 Meq In D5-1/2ns)  1,000 

mls @ 80 mls/hr IV .[id number] [facility]


   Last Admin: 05/25/17 13:50 Dose:  80 mls/hr


Lopinavir/Ritonavir (Kaletra 200-50 Mg)  2 cap PO BID [facility]


   Last Admin: 05/25/17 09:48 Dose:  2 cap


Oxycodone/Acetaminophen (Percocet 5/325 Mg Tab)  2 tab PO Q4H PRN


   PRN Reason: pain 1-8


   Stop: 05/27/17 15:07


   Last Admin: 05/25/17 09:46 Dose:  2 tab


Valganciclovir (Valcyte)  450 mg PO DAILY [facility]


   Last Admin: 05/25/17 09:49 Dose:  450 mg











- Labs


Labs: 


 











PT  12.7 SECONDS (9.7-12.2)  H  05/23/17  20:06    


 


INR  1.1   05/23/17  20:06    


 


APTT  32 SECONDS (21-34)   05/23/17  20:06

## 2017-05-26 VITALS
TEMPERATURE: 99 F | SYSTOLIC BLOOD PRESSURE: 113 MMHG | DIASTOLIC BLOOD PRESSURE: 60 MMHG | HEART RATE: 82 BPM | OXYGEN SATURATION: 94 %

## 2017-05-26 RX ADMIN — SODIUM CHLORIDE SCH MLS/HR: 9 INJECTION, SOLUTION INTRAVENOUS at 05:20

## 2017-05-26 RX ADMIN — SODIUM CHLORIDE SCH: 9 INJECTION, SOLUTION INTRAVENOUS at 13:04

## 2017-05-26 RX ADMIN — POTASSIUM CHLORIDE, DEXTROSE MONOHYDRATE AND SODIUM CHLORIDE SCH MLS/HR: 150; 5; 450 INJECTION, SOLUTION INTRAVENOUS at 04:00

## 2017-05-26 RX ADMIN — LOPINAVIR AND RITONAVIR SCH CAP: 200; 50 TABLET, FILM COATED ORAL at 09:59

## 2017-05-26 RX ADMIN — EMTRICITABINE AND TENOFOVIR DISOPROXIL FUMARATE SCH TAB: 200; 300 TABLET, FILM COATED ORAL at 09:58

## 2017-05-26 NOTE — CP.PCM.PN
Subjective





- Date & Time of Evaluation


Date of Evaluation: 05/26/17


Time of Evaluation: 11:32





- Subjective


Subjective: 





 POD# 2


afebrile VSS


FEELS BETTER


C/O POST-OPERATIVE SITE PAIN














Objective





- Vital Signs/Intake and Output


Vital Signs (last 24 hours): 


 











Temp Pulse Resp BP Pulse Ox


 


 99.0 F   82   20   113/60   94 L


 


 05/26/17 08:48  05/26/17 08:48  05/26/17 08:48  05/26/17 08:48  05/26/17 08:48








Intake and Output: 


 











 05/26/17 05/26/17





 06:59 18:59


 


Intake Total 400 


 


Output Total 1300 


 


Balance -900 














- Medications


Medications: 


 Current Medications





Docusate Sodium (Colace)  100 mg PO BID [facility]


   Last Admin: 05/26/17 09:58 Dose:  100 mg


Emtricitabine/Tenofovir (Truvada 200 Mg-300 Mg)  1 tab PO DAILY [facility]


   Last Admin: 05/26/17 09:58 Dose:  1 tab


Hydrocortisone (Cortef)  10 mg PO BID [facility]


   Last Admin: 05/26/17 09:59 Dose:  10 mg


Piperacillin Sod/Tazobactam (Sod 3.375 gm/ Sodium Chloride)  100 mls @ 200 mls/

hr IVPB Q8H [facility]


   Last Admin: 05/26/17 05:20 Dose:  200 mls/hr


Potassium Chloride/Dextrose/Sod Cl (Potassium Chl 20 Meq In D5-1/2ns)  1,000 

mls @ 80 mls/hr IV .[id number] [facility]


   Last Admin: 05/26/17 04:00 Dose:  80 mls/hr


Lopinavir/Ritonavir (Kaletra 200-50 Mg)  2 cap PO BID [facility]


   Last Admin: 05/26/17 09:59 Dose:  2 cap


Oxycodone/Acetaminophen (Percocet 5/325 Mg Tab)  2 tab PO Q4H PRN


   PRN Reason: pain 1-8


   Stop: 05/27/17 15:07


   Last Admin: 05/25/17 09:46 Dose:  2 tab


Valganciclovir (Valcyte)  450 mg PO DAILY [facility]


   Last Admin: 05/26/17 09:59 Dose:  450 mg











- Labs


Labs: 


 











PT  12.7 SECONDS (9.7-12.2)  H  05/23/17  20:06    


 


INR  1.1   05/23/17  20:06    


 


APTT  32 SECONDS (21-34)   05/23/17  20:06    














- Constitutional


Appears: No Acute Distress





- Head Exam


Head Exam: NORMAL INSPECTION





- Eye Exam


Eye Exam: EOMI, PERRL





- ENT Exam


ENT Exam: Mucous Membranes Moist





- Neck Exam


Neck Exam: Normal Inspection





- Respiratory Exam


Respiratory Exam: Clear to Ausculation Bilateral





- Cardiovascular Exam


Cardiovascular Exam: REGULAR RHYTHM, +S1, +S2





- GI/Abdominal Exam


GI & Abdominal Exam: Soft, Tenderness (MILD TENDERNESS AT THE SITE OF sTAPLES.  

wOUND CLEAN.)





- Extremities Exam


Extremities Exam: absent: Calf Tenderness, Pedal Edema





- Neurological Exam


Neurological Exam: Awake, CN II-XII Intact, Oriented x3





- Psychiatric Exam


Psychiatric exam: Normal Mood





- Skin


Skin Exam: Normal Color, Warm





Assessment and Plan


(1) Abdominal pain


Assessment & Plan: 


POSTOPERATIVE WOUND PAIN.  


dISCUSSED INCREASING AMBULATION.


aNALGESICS AS PER SURGERY.


Status: Acute   





(2) Appendicitis


Assessment & Plan: 


STATUS POST APPENDICECTOMY -POD 2.


Status: Acute   





(3) Adrenal insufficiency


Status: Chronic   





(4) CMV retinitis


Status: Chronic   





(5) Chronic hepatitis B virus infection


Status: Chronic   





(6) HIV (human immunodeficiency virus infection)


Status: Chronic

## 2017-05-26 NOTE — CP.PCM.DIS
Provider





- Provider


Date of Admission: 


05/23/17 21:47





Attending physician: 


Darius Travis MD





Time Spent in preparation of Discharge (in minutes): 35





Hospital Course





- Lab Results


Lab Results: 


 Micro Results





05/24/17 00:34   Urine,Clean Catch   Urine Culture - Final


                            No Growth (<1,000 CFU/ML)





 Most Recent Lab Values











WBC  3.6 K/uL (4.8-10.8)  L  05/23/17  20:06    


 


RBC  3.75 Mil/uL (4.40-5.90)  L  05/23/17  20:06    


 


Hgb  12.6 g/dL (12.0-18.0)   05/23/17  20:06    


 


Hct  36.3 % (35.0-51.0)   05/23/17  20:06    


 


MCV  96.7 fL (80.0-94.0)  H  05/23/17  20:06    


 


MCH  33.6 pg (27.0-31.0)  H  05/23/17  20:06    


 


MCHC  34.7 g/dL (33.0-37.0)   05/23/17  20:06    


 


RDW  12.0 % (11.5-14.5)   05/23/17  20:06    


 


Plt Count  155 K/uL (130-400)   05/23/17  20:06    


 


MPV  9.6 fL (7.2-11.7)   05/23/17  20:06    


 


Neut % (Auto)  58.9 % (50.0-75.0)   05/23/17  20:06    


 


Lymph % (Auto)  34.6 % (20.0-40.0)   05/23/17  20:06    


 


Mono % (Auto)  3.1 % (0.0-10.0)   05/23/17  20:06    


 


Eos % (Auto)  2.7 % (0.0-4.0)   05/23/17  20:06    


 


Baso % (Auto)  0.7 % (0.0-2.0)   05/23/17  20:06    


 


Neut #  2.1 K/uL (1.8-7.0)   05/23/17  20:06    


 


Lymph #  1.2 K/uL (1.0-4.3)   05/23/17  20:06    


 


Mono #  0.1 K/uL (0.0-0.8)   05/23/17  20:06    


 


Eos #  0.1 K/uL (0.0-0.7)   05/23/17  20:06    


 


Baso #  0.0 K/uL (0.0-0.2)   05/23/17  20:06    


 


PT  12.7 SECONDS (9.7-12.2)  H  05/23/17  20:06    


 


INR  1.1   05/23/17  20:06    


 


APTT  32 SECONDS (21-34)   05/23/17  20:06    


 


Sodium  141 mmol/L (132-148)   05/23/17  20:06    


 


Potassium  3.6 mmol/L (3.6-5.2)   05/23/17  20:06    


 


Chloride  103 mmol/L ()   05/23/17  20:06    


 


Carbon Dioxide  26 mmol/L (22-30)   05/23/17  20:06    


 


Anion Gap  16  (10-20)   05/23/17  20:06    


 


BUN  13 mg/dL (9-20)   05/23/17  20:06    


 


Creatinine  1.0 MG/DL (0.8-1.5)   05/23/17  20:06    


 


Est GFR ( Amer)  > 60   05/23/17  20:06    


 


Est GFR (Non-Af Amer)  > 60   05/23/17  20:06    


 


Random Glucose  109 mg/dL ()   05/23/17  20:06    


 


Calcium  8.6 mg/dl (8.6-10.4)   05/23/17  20:06    


 


Total Bilirubin  1.7 mg/dL (0.2-1.3)  H  05/23/17  20:06    


 


AST  31 U/L (17-59)   05/23/17  20:06    


 


ALT  25 U/L (21-72)   05/23/17  20:06    


 


Alkaline Phosphatase  126 U/L ()   05/23/17  20:06    


 


Total Protein  7.7 g/dL (6.3-8.3)   05/23/17  20:06    


 


Albumin  4.2 g/dL (3.5-5.0)   05/23/17  20:06    


 


Globulin  3.5 gm/dL (2.2-3.9)   05/23/17  20:06    


 


Albumin/Globulin Ratio  1.2  (1.0-2.1)   05/23/17  20:06    


 


Lipase  105 U/L ()   05/23/17  20:06    


 


Urine Color  Yellow  (YELLOW)   05/23/17  19:39    


 


Urine Clarity  Clear  (Clear)   05/23/17  19:39    


 


Urine pH  5.0  (5.0-8.0)   05/23/17  19:39    


 


Ur Specific Gravity  1.009  (1.003-1.030)   05/23/17  19:39    


 


Urine Protein  Negative mg/dL (NEGATIVE)   05/23/17  19:39    


 


Urine Glucose (UA)  Normal mg/dL (Normal)   05/23/17  19:39    


 


Urine Ketones  Negative mg/dL (NEGATIVE)   05/23/17  19:39    


 


Urine Blood  Negative  (NEGATIVE)   05/23/17  19:39    


 


Urine Nitrate  Negative  (NEGATIVE)   05/23/17  19:39    


 


Urine Bilirubin  Negative  (NEGATIVE)   05/23/17  19:39    


 


Urine Urobilinogen  Normal mg/dL (0.2-1.0)   05/23/17  19:39    


 


Ur Leukocyte Esterase  Neg Katie/uL (Negative)   05/23/17  19:39    


 


Urine WBC (Auto)  < 1 /hpf (0-5)   05/23/17  19:39    














- Hospital Course


Hospital Course: 





FOR FEW DAYS C/O OF R. LQ PAIN A/W FEVER CHILLS AND NAUSEA. WAS FIRST EVALUATED 

IN ER HOBOK[facility] AND SENT HOME . PAIN PESISTED AND REPAET CT ABD SHOWED SWOLLEN 

ROSARIO APPENDIX AND INFLAMMATION 





PAST HIST.


HIV/CMV RETINITIS/ADRENAL FAILURE /HTN /





PT UNDERWENT SUCCESSFUL APPENDECTOMY WITH NO COMPLICATION


D/C IN STABLE CONDITION 





Discharge Exam





- Head Exam


Head Exam: NORMAL INSPECTION





Discharge Plan





- Follow Up Plan


Condition: FAIR


Disposition: HOME/ ROUTINE


Instructions:  Appendicitis (DC), Open Appendectomy (DC)


Additional Instructions: 


See Post-Operative Instructions and prescription.


Follow up with Dr Wilson in one week.


Follow up with Dr ARGENTINA Hurst next Tuesday, 5/30.  Call Dr Wilson next Wednesday.


Referrals: 


Mana Hurst MD [Staff Provider] - 


Darius Travis MD [Staff Provider] - 


Shade Wilson MD [Staff Provider] -

## 2017-07-08 ENCOUNTER — HOSPITAL ENCOUNTER (EMERGENCY)
Dept: HOSPITAL 31 - C.ER | Age: 57
Discharge: HOME | End: 2017-07-08
Payer: MEDICARE

## 2017-07-08 VITALS — SYSTOLIC BLOOD PRESSURE: 130 MMHG | DIASTOLIC BLOOD PRESSURE: 88 MMHG | TEMPERATURE: 97.8 F | HEART RATE: 68 BPM

## 2017-07-08 VITALS — BODY MASS INDEX: 26.9 KG/M2

## 2017-07-08 VITALS — OXYGEN SATURATION: 99 % | RESPIRATION RATE: 16 BRPM

## 2017-07-08 DIAGNOSIS — R30.0: Primary | ICD-10-CM

## 2017-07-08 LAB
BILIRUB UR-MCNC: NEGATIVE MG/DL
GLUCOSE UR STRIP-MCNC: NORMAL MG/DL
LEUKOCYTE ESTERASE UR-ACNC: (no result) LEU/UL
PH UR STRIP: 5 [PH] (ref 5–8)
PROT UR STRIP-MCNC: NEGATIVE MG/DL
RBC # UR STRIP: NEGATIVE /UL
SP GR UR STRIP: 1.01 (ref 1–1.03)
SQUAMOUS EPITHIAL: < 1 /HPF (ref 0–5)
URINE NITRATE: NEGATIVE
UROBILINOGEN UR-MCNC: NORMAL MG/DL (ref 0.2–1)

## 2017-07-08 PROCEDURE — 96372 THER/PROPH/DIAG INJ SC/IM: CPT

## 2017-07-08 PROCEDURE — 81001 URINALYSIS AUTO W/SCOPE: CPT

## 2017-07-08 PROCEDURE — 99285 EMERGENCY DEPT VISIT HI MDM: CPT

## 2017-07-08 PROCEDURE — 87591 N.GONORRHOEAE DNA AMP PROB: CPT

## 2017-07-08 PROCEDURE — 87491 CHLMYD TRACH DNA AMP PROBE: CPT

## 2017-07-08 PROCEDURE — 87086 URINE CULTURE/COLONY COUNT: CPT

## 2017-07-08 NOTE — C.PDOC
History Of Present Illness





55 y/o male with HIV (cd 4 in 300s, vl undetectable) c/o dysuria, frequency and 

urgency with itching at urethral meatus x 2 weeks; pt seen by pmd on Wed and 

started on vantin and antifungal with no improvement in symptoms.  temp to 100 

last night, no abdominal pain, nausea. vomiting or back pain. 


Time Seen by Provider: 17 07:08


Chief Complaint (Nursing): Male Genitourinary


History Per: Patient


History/Exam Limitations: no limitations


Onset/Duration Of Symptoms: Days (14+)


Current Symptoms Are (Timing): Still Present


Severity: Moderate


Quality Of Discomfort: Burning, Other (itching)


Associated Symptoms: Fever.  denies: Chills, Nausea, Vomiting


Alleviating Factors: None


Recent travel outside of the [place]: No





Past Medical History


Reviewed: Historical Data, Nursing Documentation, Vital Signs


Vital Signs: 


 Last Vital Signs











Temp  97.7 F   17 06:50


 


Pulse  65   17 06:50


 


Resp  16   17 06:50


 


BP  141/96 H  17 06:50


 


Pulse Ox  99   17 07:24














- Medical History


PMH: Gastritis (EGD  showed Candida, GERD and Gastritis), Gall Bladder 

Disease, HIV


   Denies: Crohn's Disease, Diverticulitis, Pancreatitis, Chronic Kidney Disease


Surgical History: Appendectomy, Cholecystectomy, Endoscopy





- [facility] Procedures








COLONOSCOPY (14)


DRAINAGE OF RIGHT LOWER LOBE BRONCHUS, ENDO, DIAGN (16)


RESECTION OF APPENDIX, OPEN APPROACH (17)








Family History: States: Unknown Family Hx





- Social History


Hx Tobacco Use: No


Hx Alcohol Use: No


Hx Substance Use: No





- Immunization History


Hx Tetanus Toxoid Vaccination: Yes


Hx Influenza Vaccination: Yes


Hx Pneumococcal Vaccination: Yes





Review Of Systems


Constitutional: Positive for: Fever (to 100).  Negative for: Chills


Cardiovascular: Negative for: Chest Pain


Respiratory: Negative for: Cough


Gastrointestinal: Negative for: Nausea, Vomiting, Abdominal Pain


Genitourinary: Positive for: Dysuria, Frequency, Penile Pain (urethreal 

discomfort).  Negative for: Incontinence, Hematuria, Penile Discharge, Scrotal 

Pain


Skin: Negative for: Lesions


Neurological: Negative for: Weakness, Numbness





Physical Exam





- Physical Exam


Appears: Non-toxic, No Acute Distress


Skin: Warm, Dry


Head: Atraumatic, Normacephalic


Neck: Normal ROM


Chest: Symmetrical, No Deformity, No Tenderness


Cardiovascular: Rhythm Regular, No Murmur


Respiratory: Normal Breath Sounds, No Rales, No Rhonchi, No Wheezing


Gastrointestinal/Abdominal: Bowel Sounds, Soft, No Tenderness, Other (well 

healing scar in rlq form recent appendectomy)


Male Genital: Normal Inspection, No Testicular Tenderness, No Testicular 

Swelling, No Scrotal Swelling


Extremity: No Pedal Edema, No Calf Tenderness


Neurological/Psych: Oriented x3, Normal Speech, Normal Cognition





ED Course And Treatment


O2 Sat by Pulse Oximetry: 99





Medical Decision Making


Medical Decision Makin y/o male with urinary symptoms x 2 weeks, on vantin for 3 days with no 

improvement; will check ua,  likely change antibiotics. 





17 1001 am


ua neg for acute infection; clean catch urine sent for culture and gc/

chlamydia.  pt txed for gc/chlamydia with rocephin and zithromax.  will d/c 

patient with pmd and gu follow up. 





Disposition


Counseled Patient/Family Regarding: Studies Performed, Diagnosis, Need For 

Followup





- Disposition


Referrals: 


Alex Teresa MD [Staff Provider] - 


Disposition: HOME/ ROUTINE


Disposition Time: 10:03


Condition: STABLE


Additional Instructions: 


Finish antibiotics that Dr Hurst prescribed last week.   Follow up with Dr Hurst 

on Monday; recommend that you see Dr Teresa (urologist) if symptoms persist.  

Return to ER for any worsening symptoms, 


Instructions:  Dysuria (ED)


Forms:  General Discharge Instructions





- Clinical Impression


Clinical Impression: 


 Dysuria

## 2018-03-24 ENCOUNTER — HOSPITAL ENCOUNTER (EMERGENCY)
Dept: HOSPITAL 31 - C.ER | Age: 58
Discharge: HOME | End: 2018-03-24
Payer: MEDICARE

## 2018-03-24 VITALS
SYSTOLIC BLOOD PRESSURE: 151 MMHG | RESPIRATION RATE: 18 BRPM | DIASTOLIC BLOOD PRESSURE: 105 MMHG | OXYGEN SATURATION: 100 % | HEART RATE: 71 BPM | TEMPERATURE: 97.9 F

## 2018-03-24 VITALS — BODY MASS INDEX: 26.9 KG/M2

## 2018-03-24 DIAGNOSIS — R09.81: Primary | ICD-10-CM

## 2018-03-24 NOTE — C.PDOC
History Of Present Illness


58yo male, with HIV+ and compliant with his HAART medications, normal CD4+ 

count and undetectable viral load, presents to ED with complaints of nasal 

congestion for the past 1 week. Patient was given a daytime nasal decongestant 

with sudafed, an OTC nasal spray and antibiotics from his PMD. Patient states 

he only took 1 tab of his antibiotics and has not used the decongestant or the 

spray as he is "afraid of it." Patient denies any associated fever, chills, 

sore throat, shortness of breath. No other complaints. 


Time Seen by Provider: 03/24/18 09:49


Chief Complaint (Nursing): ENT Problem


History Per: Patient


History/Exam Limitations: None


Onset/Duration Of Symptoms: Days


Current Symptoms Are (Timing): Still Present





Past Medical History


Reviewed: Historical Data, Nursing Documentation, Vital Signs


Vital Signs: 


 Last Vital Signs











Temp  97.9 F   03/24/18 09:38


 


Pulse  71   03/24/18 09:38


 


Resp  18   03/24/18 09:38


 


BP  151/105 H  03/24/18 09:38


 


Pulse Ox  100   03/24/18 10:00














- Medical History


PMH: Gastritis (EGD 4/16 showed Candida, GERD and Gastritis), Gall Bladder 

Disease, HIV


   Denies: Crohn's Disease, Diverticulitis, Pancreatitis, Chronic Kidney Disease


Surgical History: Appendectomy, Cholecystectomy, Endoscopy





- [facility] Procedures








COLONOSCOPY (12/03/14)


DRAINAGE OF RIGHT LOWER LOBE BRONCHUS, ENDO, DIAGN (08/03/16)


RESECTION OF APPENDIX, OPEN APPROACH (05/23/17)








Family History: States: Unknown Family Hx





- Social History


Hx Tobacco Use: No


Hx Alcohol Use: No


Hx Substance Use: No





- Immunization History


Hx Tetanus Toxoid Vaccination: Yes


Hx Influenza Vaccination: Yes


Hx Pneumococcal Vaccination: Yes





Review Of Systems


Except As Marked, All Systems Reviewed And Found Negative.


Constitutional: Negative for: Fever, Chills


ENT: Positive for: Nose Congestion.  Negative for: Throat Pain


Respiratory: Negative for: Shortness of Breath





Physical Exam





- Physical Exam


Appears: Non-toxic, No Acute Distress


Skin: Normal Color, Warm, Dry


Eye(s): bilateral: PERRL, EOMI


Ear(s): Bilateral: Normal


Nose: Normal, Other (mild left > right nasal passage erythema, no discharge 

noted.)


Oral Mucosa: Moist


Throat: Normal


Neck: Normal ROM, Supple


Chest: Symmetrical


Cardiovascular: Rhythm Regular


Respiratory: Normal Breath Sounds





ED Course And Treatment


O2 Sat by Pulse Oximetry: 100 (RA)


Pulse Ox Interpretation: Normal





Medical Decision Making


Medical Decision Making: 





minor nasal congestion


no infection





no abx required


difficulty sleeping when taking pseudafed @ night- Nyquil recommended.





Disposition


Doctor Will See Patient In The: Office


Counseled Patient/Family Regarding: Studies Performed, Diagnosis





- Disposition


Referrals: 


Mana Hurst MD [Staff Provider] - 


Disposition: HOME/ ROUTINE


Disposition Time: 10:00


Condition: GOOD


Additional Instructions: 


glo lia pseudafed en la noche- se hace deficil dormir.





[place] NYQUIL (o' el equivalente) en la noche





sigue con Dr. Hurst.


Instructions:  Cough, Runny Nose, and the Common Cold (DC), Pseudoephedrine


Forms:  [facility] (English)


Print Language: Hungarian





- Clinical Impression


Clinical Impression: 


 Nasal congestion








- Scribe Statement


The provider has reviewed the documentation as recorded by the Scribe (Mirian Butler)


Provider Attestation: 


All medical record entries made by the Scribe were at my direction and 

personally dictated by me. I have reviewed the chart and agree that the record 

accurately reflects my personal performance of the history, physical exam, 

medical decision making, and the department course for this patient. I have 

also personally directed, reviewed, and agree with the discharge instructions 

and disposition.

## 2018-03-30 ENCOUNTER — HOSPITAL ENCOUNTER (EMERGENCY)
Dept: HOSPITAL 31 - C.ER | Age: 58
Discharge: HOME | End: 2018-03-30
Payer: MEDICARE

## 2018-03-30 VITALS
HEART RATE: 70 BPM | OXYGEN SATURATION: 96 % | RESPIRATION RATE: 16 BRPM | SYSTOLIC BLOOD PRESSURE: 142 MMHG | TEMPERATURE: 97.9 F | DIASTOLIC BLOOD PRESSURE: 98 MMHG

## 2018-03-30 VITALS — BODY MASS INDEX: 26.9 KG/M2

## 2018-03-30 DIAGNOSIS — K21.9: ICD-10-CM

## 2018-03-30 DIAGNOSIS — Z21: ICD-10-CM

## 2018-03-30 DIAGNOSIS — R09.81: Primary | ICD-10-CM

## 2018-03-30 LAB
ALBUMIN SERPL-MCNC: 4.4 G/DL (ref 3.5–5)
ALBUMIN/GLOB SERPL: 1.1 {RATIO} (ref 1–2.1)
ALT SERPL-CCNC: 23 U/L (ref 21–72)
AST SERPL-CCNC: 21 U/L (ref 17–59)
BASOPHILS # BLD AUTO: 0 K/UL (ref 0–0.2)
BASOPHILS NFR BLD: 0.9 % (ref 0–2)
BUN SERPL-MCNC: 9 MG/DL (ref 9–20)
CALCIUM SERPL-MCNC: 8.3 MG/DL (ref 8.6–10.4)
EOSINOPHIL # BLD AUTO: 0.1 K/UL (ref 0–0.7)
EOSINOPHIL NFR BLD: 3.5 % (ref 0–4)
ERYTHROCYTE [DISTWIDTH] IN BLOOD BY AUTOMATED COUNT: 12 % (ref 11.5–14.5)
GFR NON-AFRICAN AMERICAN: > 60
HGB BLD-MCNC: 13.4 G/DL (ref 12–18)
LYMPHOCYTES # BLD AUTO: 1.3 K/UL (ref 1–4.3)
LYMPHOCYTES NFR BLD AUTO: 31.5 % (ref 20–40)
MCH RBC QN AUTO: 33.7 PG (ref 27–31)
MCHC RBC AUTO-ENTMCNC: 35.5 G/DL (ref 33–37)
MCV RBC AUTO: 94.9 FL (ref 80–94)
MONOCYTES # BLD: 0.2 K/UL (ref 0–0.8)
MONOCYTES NFR BLD: 6 % (ref 0–10)
NEUTROPHILS # BLD: 2.4 K/UL (ref 1.8–7)
NEUTROPHILS NFR BLD AUTO: 58.1 % (ref 50–75)
NRBC BLD AUTO-RTO: 0 % (ref 0–2)
PLATELET # BLD: 179 K/UL (ref 130–400)
PMV BLD AUTO: 8.8 FL (ref 7.2–11.7)
RBC # BLD AUTO: 3.99 MIL/UL (ref 4.4–5.9)
WBC # BLD AUTO: 4.2 K/UL (ref 4.8–10.8)

## 2018-03-31 NOTE — CT
PROCEDURE:  CT sinuses dated 03/30/2018 



HISTORY:

Chronic nasal congestion 



COMPARISON:

Correlation made with plain film radiographs of the sinuses dated 

09/02/2014.



TECHNIQUE:

Contiguous axial CT  images of the paranasal sinuses were obtained. 

Coronal and sagittal reformats were generated.



Radiation dose:



Total exam DLP = 694.08 mGy-cm.



This CT exam was performed using one or more of the following dose 

reduction techniques: Automated exposure control, adjustment of the 

mA and/or kV according to patient size, and/or use of iterative 

reconstruction technique.



FINDINGS:



FRONTAL SINUSES:

Minimal mucosal thickening left aspect frontal sinus



ETHMOID SINUSES:

Minor mucosal thickening within the ethmoid air complex extending 

superiorly



SPHENOID SINUSES:

Clear.



MAXILLARY SINUSES:

Minimal mucosal thickening seen within the right maxillary antrum



SINUS DRAINAGE:

Osteomeatal complexes, frontal recesses and sphenoethmoid recesses 

clear.



NASAL SEPTUM:

Mild leftward deviation of the nasal septum with a small left-sided 

septal bone spur. No destructive lesion.



MASS: None.



SKULL BASE:

Unremarkable.



TEMPORAL BONES:

Middle ears and mastoid grossly unremarkable.



OTHER FINDINGS:

None.



IMPRESSION:

No evidence of acute sinusitis. .  Minimal mucosal thickening seen 

within the right maxillary sinus, ethmoid and frontal sinuses as 

described. . 



Mild leftward deviation of the nasal septum with small left-sided 

septal bone spur.

## 2018-05-24 ENCOUNTER — HOSPITAL ENCOUNTER (EMERGENCY)
Dept: HOSPITAL 14 - H.ER | Age: 58
Discharge: HOME | End: 2018-05-24
Payer: MEDICARE

## 2018-05-24 VITALS — TEMPERATURE: 97.9 F

## 2018-05-24 VITALS
OXYGEN SATURATION: 100 % | HEART RATE: 56 BPM | DIASTOLIC BLOOD PRESSURE: 87 MMHG | SYSTOLIC BLOOD PRESSURE: 125 MMHG | RESPIRATION RATE: 17 BRPM

## 2018-05-24 VITALS — BODY MASS INDEX: 26.9 KG/M2

## 2018-05-24 DIAGNOSIS — R10.811: Primary | ICD-10-CM

## 2018-05-24 DIAGNOSIS — K21.9: ICD-10-CM

## 2018-05-24 LAB
ALBUMIN SERPL-MCNC: 4.2 G/DL (ref 3.5–5)
ALBUMIN/GLOB SERPL: 1 {RATIO} (ref 1–2.1)
ALT SERPL-CCNC: 33 U/L (ref 21–72)
APTT BLD: 30.5 SECONDS (ref 25.6–37.1)
AST SERPL-CCNC: 27 U/L (ref 17–59)
BASOPHILS # BLD AUTO: 0 K/UL (ref 0–0.2)
BASOPHILS NFR BLD: 1 % (ref 0–2)
BUN SERPL-MCNC: 11 MG/DL (ref 9–20)
CALCIUM SERPL-MCNC: 9.1 MG/DL (ref 8.4–10.2)
EOSINOPHIL # BLD AUTO: 0.4 K/UL (ref 0–0.7)
EOSINOPHIL NFR BLD: 10.7 % (ref 0–4)
ERYTHROCYTE [DISTWIDTH] IN BLOOD BY AUTOMATED COUNT: 12.7 % (ref 11.5–14.5)
GFR NON-AFRICAN AMERICAN: > 60
HGB BLD-MCNC: 13.6 G/DL (ref 12–18)
INR PPP: 1.1 (ref 0.9–1.2)
LIPASE SERPL-CCNC: 102 U/L (ref 23–300)
LYMPHOCYTES # BLD AUTO: 1.2 K/UL (ref 1–4.3)
LYMPHOCYTES NFR BLD AUTO: 31.7 % (ref 20–40)
MCH RBC QN AUTO: 33.5 PG (ref 27–31)
MCHC RBC AUTO-ENTMCNC: 34.6 G/DL (ref 33–37)
MCV RBC AUTO: 97 FL (ref 80–94)
MONOCYTES # BLD: 0.2 K/UL (ref 0–0.8)
MONOCYTES NFR BLD: 5.2 % (ref 0–10)
NEUTROPHILS # BLD: 2 K/UL (ref 1.8–7)
NEUTROPHILS NFR BLD AUTO: 51.4 % (ref 50–75)
NRBC BLD AUTO-RTO: 0 % (ref 0–0)
PLATELET # BLD: 166 K/UL (ref 130–400)
PMV BLD AUTO: 9.3 FL (ref 7.2–11.7)
PROTHROMBIN TIME: 12.7 SECONDS (ref 9.8–13.1)
RBC # BLD AUTO: 4.05 MIL/UL (ref 4.4–5.9)
WBC # BLD AUTO: 3.9 K/UL (ref 4.8–10.8)

## 2018-05-24 PROCEDURE — 84484 ASSAY OF TROPONIN QUANT: CPT

## 2018-05-24 PROCEDURE — 85610 PROTHROMBIN TIME: CPT

## 2018-05-24 PROCEDURE — 99283 EMERGENCY DEPT VISIT LOW MDM: CPT

## 2018-05-24 PROCEDURE — 96375 TX/PRO/DX INJ NEW DRUG ADDON: CPT

## 2018-05-24 PROCEDURE — 85025 COMPLETE CBC W/AUTO DIFF WBC: CPT

## 2018-05-24 PROCEDURE — 85730 THROMBOPLASTIN TIME PARTIAL: CPT

## 2018-05-24 PROCEDURE — 83690 ASSAY OF LIPASE: CPT

## 2018-05-24 PROCEDURE — 96365 THER/PROPH/DIAG IV INF INIT: CPT

## 2018-05-24 PROCEDURE — 93005 ELECTROCARDIOGRAM TRACING: CPT

## 2018-05-24 PROCEDURE — 80053 COMPREHEN METABOLIC PANEL: CPT

## 2018-08-14 ENCOUNTER — HOSPITAL ENCOUNTER (OUTPATIENT)
Dept: HOSPITAL 31 - C.ENDO | Age: 58
Discharge: HOME | End: 2018-08-14
Attending: INTERNAL MEDICINE
Payer: MEDICARE

## 2018-08-14 VITALS — RESPIRATION RATE: 12 BRPM | HEART RATE: 56 BPM | DIASTOLIC BLOOD PRESSURE: 77 MMHG | SYSTOLIC BLOOD PRESSURE: 114 MMHG

## 2018-08-14 VITALS — OXYGEN SATURATION: 100 %

## 2018-08-14 VITALS — BODY MASS INDEX: 27.1 KG/M2

## 2018-08-14 VITALS — TEMPERATURE: 97.9 F

## 2018-08-14 DIAGNOSIS — K21.0: ICD-10-CM

## 2018-08-14 DIAGNOSIS — B20: ICD-10-CM

## 2018-08-14 DIAGNOSIS — K64.8: Primary | ICD-10-CM

## 2018-08-14 PROCEDURE — 45378 DIAGNOSTIC COLONOSCOPY: CPT

## 2018-08-14 NOTE — CP.SDSHP
[facility]





- History


Proposed Procedure: colonoscopy


Pre-Op Diagnosis: rectal bleeding





- Previous Medical/Surgical History


Misc: Hepatitis (HBV), Other (HBV/HIV coinfection)


Previous Surgical History: Cholecystectomy.  Appendectomy





- Allergies


Allergies: 


Allergies





No Known Allergies Allergy (Verified 03/30/18 18:36)


 











- Physical Exam


Vital Signs: 


 Vital Signs











  08/14/18





  08:02


 


Temperature 97 F L


 


Pulse Rate 56 L


 


Respiratory 19





Rate 


 


Blood Pressure 132/94 H


 


O2 Sat by Pulse 100





Oximetry 











Mental Status: Alert & Oriented x3


Neuro: WNL


Heart: WNL


Lungs: WNL


GI: WNL





- Impression


Impression: Rectal bleeding


Pt. Evaluated Today:Candidate for Anesthesia & Procedure: Yes





- Date & Time


Date: 08/14/18


Time: 08:26





Short Stay Discharge





- Short Stay Discharge


Admitting Diagnosis/Reason for Visit: GERD / MELENA


Disposition: HOME/ ROUTINE


Referrals: 


Mana Hurst MD [Primary Care Provider] -

## 2018-08-14 NOTE — CP.SDSHP
[facility]





- History


Proposed Procedure: colonoscopy


Pre-Op Diagnosis: screening for colon cancer, previous poor prep





- Previous Medical/Surgical History


Cardiac: Hypertension, Other (hyperlipidemia, diverticulosis)


Endocrine/Metabolic: Obesity


Previous Surgical History: carpal tunnel repair.  left knee arthroscope





- Allergies


Allergies: 


Allergies





No Known Allergies Allergy (Verified 03/30/18 18:36)


 











- Physical Exam


Mental Status: Alert & Oriented x3


Neuro: WNL


Heart: WNL


Lungs: WNL


GI: WNL





- Impression


Impression: screening for colon cancer.  prior inadequate preparation


Pt. Evaluated Today:Candidate for Anesthesia & Procedure: Yes





- Date & Time


Date: 08/14/18


Time: 07:54





Short Stay Discharge





- Short Stay Discharge


Admitting Diagnosis/Reason for Visit: GERD / MELENA


Disposition: HOME/ ROUTINE


Referrals: 


Mana Hurst MD [Primary Care Provider] -

## 2018-12-18 ENCOUNTER — HOSPITAL ENCOUNTER (EMERGENCY)
Dept: HOSPITAL 31 - C.ER | Age: 58
Discharge: HOME | End: 2018-12-18
Payer: MEDICARE

## 2018-12-18 VITALS — BODY MASS INDEX: 27.1 KG/M2

## 2018-12-18 VITALS
RESPIRATION RATE: 18 BRPM | DIASTOLIC BLOOD PRESSURE: 90 MMHG | OXYGEN SATURATION: 97 % | TEMPERATURE: 97.8 F | SYSTOLIC BLOOD PRESSURE: 138 MMHG | HEART RATE: 78 BPM

## 2018-12-18 DIAGNOSIS — M62.838: Primary | ICD-10-CM

## 2018-12-18 NOTE — C.PDOC
History Of Present Illness


59 y/o male pt presents to the ER c/o right shoulder pain for x3 weeks. Right 

side of neck focal area is worse with various head movements and right arm 

movements. Pain is radiating down the right arm with numbness. Pt is actively 

evaluated by PMD Dr. OZ Hurst who gave him prescription for anti-inflammatory, 

XRay and bone density screening. Pt denies fever and injuries. 


Time Seen by Provider: 12/18/18 11:47


Chief Complaint (Nursing): Back Pain


History Per: Patient


History/Exam Limitations: no limitations


Onset/Duration Of Symptoms: Days (x3 weeks)


Current Symptoms Are (Timing): Still Present





Past Medical History


Reviewed: Historical Data, Nursing Documentation, Vital Signs


Vital Signs: 





                                Last Vital Signs











Temp  97.8 F   12/18/18 11:45


 


Pulse  78   12/18/18 11:45


 


Resp  18   12/18/18 11:45


 


BP  138/90   12/18/18 11:45


 


Pulse Ox  97   12/18/18 11:45














- Medical History


PMH: Gastritis, Gall Bladder Disease, HIV


Surgical History: Appendectomy, Cholecystectomy, Endoscopy





- [facility] Procedures











COLONOSCOPY (12/03/14)


DRAINAGE OF RIGHT LOWER LOBE BRONCHUS, ENDO, DIAGN (08/03/16)


RESECTION OF APPENDIX, OPEN APPROACH (05/23/17)








Family History: States: Unknown Family Hx





- Social History


Hx Tobacco Use: No


Hx Alcohol Use: No


Hx Substance Use: No





- Immunization History


Hx Tetanus Toxoid Vaccination: No (Patient unsure)


Hx Influenza Vaccination: Yes (2018)


Hx Pneumococcal Vaccination: Yes (2018)





Review Of Systems


Except As Marked, All Systems Reviewed And Found Negative.


Constitutional: Negative for: Chills


Neurological: Negative for: Dizziness





Physical Exam





- Physical Exam


Additional Physical Exam Comments: 





Constitutional: No acute distress.





Head: Normocephalic. Atraumatic.





Eyes: PERRL.





ENT: Moist mucous membranes.





Neck: Supple. no midline tenderness 





Cardiovascular: Regular rate. Radial pulse 2+ bilaterally.





Chest: No tenderness.





Respiratory: Clear to auscultation bilaterally.





GI: Soft. Nontender. Nondistended.





Back: No CVA tenderness.





Musculoskeletal: No tenderness or swelling of extremities. intact motor of 

digits, wrist, elbow and shoulder. no deformity. 





Skin: No rash.





Neurologic: Alert, no focal deficit.











ED Course And Treatment


O2 Sat by Pulse Oximetry: 97 (RA)


Pulse Ox Interpretation: Normal





Medical Decision Making


Medical Decision Making: 


Plans: 


-- CT cervical spine 


Note: Discuss case with Dr. OZ Hurst, she requested CT of cervical spine. Pt and 

PMD was informed definitive study would be MRI, Dr. OZ Hurst understands.  





Reviewed rsults of CT with Dr. Hurst who recommends discharge on muscle Flexeril 

and f/u with her in office. 





Disposition


Discussed With Dr.: Mana Hurst


Doctor Will See Patient In The: Office





- Disposition


Disposition: HOME/ ROUTINE


Disposition Time: 13:21


Condition: STABLE


Additional Instructions: 


Date of service:12/18/2018





CT cervical spine without IV contrast 





Indication: neck pain





Comparison: Cervical spine x-ray performed 12/6/18 





Technique: 





Axial computed tomography images were obtained of the cervical spine without the

use of intravenous contrast. Coronal and sagittal reformatted images were 

created and reviewed.





This CT exam was performed using 1 or more of the following dose reduction 

techniques: Automated exposure control, adjustment of the MAA and/or kV 

according to patient size, and/or use of iterative reconstruction technique. 





Radiation dose:





Total exam DLP = 503.78 mGy-cm.





Findings: 





Straightening of the normal cervical lordosis may be related to muscle spasm or 

positioning. There is no evidence of acute fracture or subluxation.  Mild 

multilevel degenerative changes.  Vertebral body heights appear within normal 

limits.  The prevertebral soft tissues and spinolaminar lines appear intact.  

The lateral masses are preserved.  The dens tip is intact.  There is proper 

alignment of the lateral masses of C1 with the C2 vertebral body. 





Included portions of the thyroid gland appear unremarkable.  Included portions 

of lung apices appear clear. 





Impression: 





Straightening of the normal cervical lordosis may be related to muscle spasm or 

positioning. 





No evidence of acute fracture or subluxation. 





Degenerative changes.


Prescriptions: 


Cyclobenzaprine [Cyclobenzaprine HCl] 10 mg PO Q8H PRN #15 tab


 PRN Reason: Pain, Moderate (4-7)


Instructions:  Muscle Spasms (DC)


Forms:  [facility] (English)





- Clinical Impression


Clinical Impression: 


 Cervical paraspinal muscle spasm








- Scribe Statement


The provider has reviewed the documentation as recorded by the Scribtrevin Gonsalves Do


Provider Attestation: 


All medical record entries made by the Scribe were at my direction and 

personally dictated by me. I have reviewed the chart and agree that the record 

accurately reflects my personal performance of the history, physical exam, 

medical decision making, and the department course for this patient. I have also

personally directed, reviewed, and agree with the discharge instructions and 

disposition.

## 2018-12-18 NOTE — CT
Date of service:12/18/2018



CT cervical spine without IV contrast 



Indication: neck pain



Comparison: Cervical spine x-ray performed 12/6/18 



Technique: 



Axial computed tomography images were obtained of the cervical spine 

without the use of intravenous contrast. Coronal and sagittal 

reformatted images were created and reviewed.



This CT exam was performed using 1 or more of the following dose 

reduction techniques: Automated exposure control, adjustment of the 

MAA and/or kV according to patient size, and/or use of iterative 

reconstruction technique. 



Radiation dose:



Total exam DLP = 503.78 mGy-cm.



Findings: 



Straightening of the normal cervical lordosis may be related to 

muscle spasm or positioning. There is no evidence of acute fracture 

or subluxation.  Mild multilevel degenerative changes.  Vertebral 

body heights appear within normal limits.  The prevertebral soft 

tissues and spinolaminar lines appear intact.  The lateral masses are 

preserved.  The dens tip is intact.  There is proper alignment of the 

lateral masses of C1 with the C2 vertebral body. 



Included portions of the thyroid gland appear unremarkable.  Included 

portions of lung apices appear clear. 



Impression: 



Straightening of the normal cervical lordosis may be related to 

muscle spasm or positioning. 



No evidence of acute fracture or subluxation. 



Degenerative changes.

## 2019-03-10 NOTE — C.PDOC
History Of Present Illness





58 years old male presents to ED for complaints of swelling and burning 

sensation of palate to the roof of the mouth that began 1 week ago. Patient 

reports symptoms radiate to his sinuses and reports associated symptoms of 

fever. Patient also reports he experienced right sided nose bleed 4 days ago. 

Denies any dental pain, trauma, injuries, headache, visual changes, or any other

physical complaints. Patient states "I was concerned of having an infections so 

thats why I came here." 





PMD: 


* Dr. Leonadro wilson


Time Seen by Provider: 03/10/19 08:17


Chief Complaint (Nursing): ENT Problem


History Per: Patient


History/Exam Limitations: None


Onset/Duration Of Symptoms: Days (7)


Current Symptoms Are (Timing): Still Present


Quality (Ear): Swelling


Symptoms Have Been: Continuous


Severity: None


Anticoagulant/Antiplatlet Use?: No


Recent Aspirin Use: Unknown





Past Medical History


Reviewed: Historical Data, Nursing Documentation, Vital Signs


Vital Signs: 





                                Last Vital Signs











Temp  97.6 F   03/10/19 07:58


 


Pulse  88   03/10/19 07:58


 


Resp  18   03/10/19 07:58


 


BP  150/96 H  03/10/19 07:58


 


Pulse Ox  99   03/10/19 07:58














- Medical History


PMH: Gastritis, Gall Bladder Disease, HIV


Surgical History: Appendectomy, Cholecystectomy, Endoscopy





- [facility] Procedures











COLONOSCOPY (12/03/14)


DRAINAGE OF RIGHT LOWER LOBE BRONCHUS, ENDO, DIAGN (08/03/16)


RESECTION OF APPENDIX, OPEN APPROACH (05/23/17)








Family History: States: Unknown Family Hx





- Social History


Hx Tobacco Use: No


Hx Alcohol Use: No


Hx Substance Use: No





- Immunization History


Hx Tetanus Toxoid Vaccination:  (Patient unsure)


Hx Influenza Vaccination: Yes (2019)


Hx Pneumococcal Vaccination: Yes (2018)





Review Of Systems


Except As Marked, All Systems Reviewed And Found Negative.


Constitutional: Positive for: Fever.  Negative for: Chills


ENT: Positive for: Other (Swelling and burning sensation of palate to the roof 

of the mouth. )


Gastrointestinal: Negative for: Nausea, Vomiting, Abdominal Pain, Diarrhea


Skin: Negative for: Rash


Neurological: Negative for: Weakness, Numbness





Physical Exam





- Physical Exam


Appears: Non-toxic, No Acute Distress


Skin: Normal Color, Warm, Dry, No Rash


Head: Atraumatic, Normacephalic, Other (No tender to palpation over maxillary 

sinus)


Eye(s): bilateral: Normal Inspection, PERRL, EOMI, Other (Conjunctiva clear)


Ear(s): Bilateral: Normal


Nose: Other (Left turbinator )


Oral Mucosa: Moist (Pink )


Tongue: Normal Appearing


Lips: Normal Appearing


Teeth: Normal Dentition


Gingiva: Normal Appearing


Throat: No Erythema, No Exudate, No Drooling, No Mass, Other (Anterior soft pal

ate with moderate swelling and tenderness. No fluctuance. No masses to floor of 

mouth. Oropharynx clear. Palate no lesions noted or vesicular or pustular 

lesions. )


Neck: Normal ROM, Supple


Lymphatic: Other


Chest: Symmetrical


Cardiovascular: Rhythm Regular, No Murmur, Other (Normal S1, S2)


Respiratory: Normal Breath Sounds (Good air movement, Lungs CTA bilaterally), No

Rales, No Rhonchi, No Wheezing


Gastrointestinal/Abdominal: Soft, No Tenderness


Extremity: Normal ROM


Extremity: Bilateral: Atraumatic, Normal Color And Temperature, Normal ROM, 

Other (no cyanosis or edema)


Pulses: Left Radial: Normal, Right Radial: Normal


Neurological/Psych: Oriented x3, Normal Speech, Normal Motor, Normal Sensation, 

Other (GCS 15, CN 2-12 intact)


Gait: Steady





ED Course And Treatment





- Laboratory Results


Result Diagrams: 


                                 03/10/19 10:40





                                 03/10/19 10:40


O2 Sat by Pulse Oximetry: 99 (RA)


Pulse Ox Interpretation: Normal





- CT Scan/US


  ** CT Sinuses


Other Rad Studies (CT/US): Read By Radiologist, Radiology Report Reviewed


CT/US Interpretation: Date of service:  03/10/2019.  PROCEDURE:  CT SINUSES 

WITHOUT CONTRAST.  HISTORY:  sinusitis/pain.  COMPARISON:  Comparison is made to

the previous study dated 03/30/2018.  TECHNIQUE:  Contiguous axial CT  images of

the paranasal sinuses were obtained. Coronal and sagittal reformats were gener

ated.  Radiation dose:  Total exam DLP = 680.27 mGy-cm.  This CT exam was 

performed using one or more of the following dose reduction techniques: 

Automated exposure control, adjustment of the mA and/or kV according to patient 

size, and/or use of iterative reconstruction technique.  FINDINGS:  FRONTAL 

SINUSES:  No evidence of significant mucosal thickening.  ETHMOID SINUSES:  Mild

mucosal thickening is noted.  SPHENOID SINUSES:  No evidence of significant 

mucosal thickening.  MAXILLARY SINUSES:  Mild mucosal thickening noted more 

prominent on the right.  SINUS DRAINAGE:  Mild to moderate narrowing of the 

osteomeatal complexes, frontal recesses and sphenoethmoid recesses is noted.  

NASAL SEPTUM:  Moderate nasal septum deviation to the left is noted.  Bilateral 

small ghislaine bullosa is noted.  MASS: None.  SKULL BASE:  Unremarkable.  

TEMPORAL BONES:  Middle ears and mastoid grossly unremarkable.  OTHER FINDINGS: 

None.  IMPRESSION:  Mild mucosal thickening noted in the ethmoid and maxillary 

sinuses more prominent on the right.  Moderate nasal septum deviation to the 

left noted associated with diffuse mucosal thickening in the nasal cavity and 

bilateral small ghislaine bullosa.





Medical Decision Making


Medical Decision Making: 





Plan:


* CT Sinuses 





9:40:


* Called Dr. Wilson's office and left a message





10:06:


* Called Dr. Wilson's office and left a message





10:21:


Spoke with OZ Brenner which recommended to check CBC and CMP then send home 

with on acyclovir 500 mg twice a day for 5 days. 





Progress: 


CBC within normal limits.


Tbili levels are elevated but has been elevated in the past 2.7, 2.5, 2.4. 





11:20:


Patient is stable for discharge and will be discharged. Acyclovir tablet comes 

only in 400mg and 800mg tab so patient will be discharged with the 400mg tab. 

Patient is in agreement. Return if symptoms worsen or persist. 





Disposition


Counseled Patient/Family Regarding: Studies Performed, Diagnosis, Need For 

Followup





- Disposition


Referrals: 


Mana Wilson MD [Staff Provider] - 


Disposition: HOME/ ROUTINE


Disposition Time: 11:09


Condition: STABLE


Additional Instructions: 





CALISTA HELMS, thank you for letting us take care of you today. Your

provider was Savannah Khan MD and you were treated for POSSIBLE SINUS INFECTION.

The emergency medical care you received today was directed at your acute 

symptoms. If you were prescribed any medication, please fill it and take as 

directed. It may take several days for your symptoms to resolve. Return to the 

Emergency Department if your symptoms worsen, do not improve, or if you have any

other problems.





Please contact your doctor in one day for a follow up appointment. Bring any 

paperwork you were given at discharge with you along with any medications you 

are taking to your follow up visit. Our treatment cannot replace ongoing medical

care by a primary care provider outside of the emergency department.





Thank you for allowing the [facility] team to be part of your care today.











Prescriptions: 


Acyclovir 400 mg PO BID #10 tablet


Ibuprofen [Motrin Tab] 800 mg PO TID PRN #30 tab


 PRN Reason: Pain, Moderate (4-7)


Instructions:  Mouth Sores (DC)


Forms:  [facility] (English)





- POA


Present On Arrival: None





- Clinical Impression


Clinical Impression: 


 Mouth pain








- Scribe Statement


The provider has reviewed the documentation as recorded by the Scribe





Akshat Schofield





All medical record entries made by the Scribe were at my direction and 

personally dictated by me. I have reviewed the chart and agree that the record 

accurately reflects my personal performance of the history, physical exam, 

medical decision making, and the department course for this patient. I have also

personally directed, reviewed, and agree with the discharge instructions and 

disposition.rui

## 2019-03-10 NOTE — CT
Date of service: 



03/10/2019



PROCEDURE:  CT SINUSES WITHOUT CONTRAST



HISTORY:

sinusitis/pain







COMPARISON:

Comparison is made to the previous study dated 03/30/2018



TECHNIQUE:

Contiguous axial CT  images of the paranasal sinuses were obtained. 

Coronal and sagittal reformats were generated.



Radiation dose:



Total exam DLP = 680.27 mGy-cm.



This CT exam was performed using one or more of the following dose 

reduction techniques: Automated exposure control, adjustment of the 

mA and/or kV according to patient size, and/or use of iterative 

reconstruction technique.



FINDINGS:



FRONTAL SINUSES:

No evidence of significant mucosal thickening.



ETHMOID SINUSES:

Mild mucosal thickening is noted.



SPHENOID SINUSES:

No evidence of significant mucosal thickening.



MAXILLARY SINUSES:

Mild mucosal thickening noted more prominent on the right



SINUS DRAINAGE:

Mild to moderate narrowing of the osteomeatal complexes, frontal 

recesses and sphenoethmoid recesses is noted. 



NASAL SEPTUM:

Moderate nasal septum deviation to the left is noted.  Bilateral 

small ghislaine bullosa is noted.



MASS: None.



SKULL BASE:

Unremarkable.



TEMPORAL BONES:

Middle ears and mastoid grossly unremarkable.



OTHER FINDINGS:

None.



IMPRESSION:

Mild mucosal thickening noted in the ethmoid and maxillary sinuses 

more prominent on the right.



Moderate nasal septum deviation to the left noted associated with 

diffuse mucosal thickening in the nasal cavity and bilateral small 

ghislaine bullosa.

## 2019-05-24 ENCOUNTER — HOSPITAL ENCOUNTER (EMERGENCY)
Dept: HOSPITAL 14 - H.ER | Age: 59
Discharge: HOME | End: 2019-05-24
Payer: MEDICARE

## 2019-05-24 VITALS
SYSTOLIC BLOOD PRESSURE: 137 MMHG | RESPIRATION RATE: 18 BRPM | DIASTOLIC BLOOD PRESSURE: 97 MMHG | TEMPERATURE: 97.6 F | HEART RATE: 62 BPM | OXYGEN SATURATION: 100 %

## 2019-05-24 VITALS — BODY MASS INDEX: 27.7 KG/M2

## 2019-05-24 DIAGNOSIS — R07.89: Primary | ICD-10-CM

## 2019-05-24 LAB
ALBUMIN SERPL-MCNC: 4.6 G/DL (ref 3.5–5)
ALBUMIN/GLOB SERPL: 1.3 {RATIO} (ref 1–2.1)
ALT SERPL-CCNC: 22 U/L (ref 21–72)
ANISOCYTOSIS BLD QL SMEAR: SLIGHT
AST SERPL-CCNC: 21 U/L (ref 17–59)
BASOPHILS # BLD AUTO: 0 K/UL (ref 0–0.2)
BASOPHILS NFR BLD: 0.2 % (ref 0–2)
BUN SERPL-MCNC: 15 MG/DL (ref 9–20)
CALCIUM SERPL-MCNC: 9 MG/DL (ref 8.4–10.2)
EOSINOPHIL # BLD AUTO: 0 K/UL (ref 0–0.7)
EOSINOPHIL NFR BLD: 0 % (ref 0–4)
ERYTHROCYTE [DISTWIDTH] IN BLOOD BY AUTOMATED COUNT: 11.9 % (ref 11.5–14.5)
GFR NON-AFRICAN AMERICAN: > 60
HGB BLD-MCNC: 13.7 G/DL (ref 12–18)
LG PLATELETS BLD QL SMEAR: PRESENT
LIPASE SERPL-CCNC: 62 U/L (ref 23–300)
LYMPHOCYTE: 9 % (ref 20–50)
LYMPHOCYTES # BLD AUTO: 0.6 K/UL (ref 1–4.3)
LYMPHOCYTES NFR BLD AUTO: 9.1 % (ref 20–40)
MCH RBC QN AUTO: 33.9 PG (ref 27–31)
MCHC RBC AUTO-ENTMCNC: 34.8 G/DL (ref 33–37)
MCV RBC AUTO: 97.4 FL (ref 80–94)
MONOCYTE: 3 % (ref 0–10)
MONOCYTES # BLD: 0.1 K/UL (ref 0–0.8)
MONOCYTES NFR BLD: 2.1 % (ref 0–10)
NEUTROPHILS # BLD: 5.7 K/UL (ref 1.8–7)
NEUTROPHILS NFR BLD AUTO: 88 % (ref 42–75)
NEUTROPHILS NFR BLD AUTO: 88.6 % (ref 50–75)
NRBC BLD AUTO-RTO: 0 % (ref 0–0)
PLATELET # BLD EST: NORMAL 10*3/UL
PLATELET # BLD: 194 K/UL (ref 130–400)
PMV BLD AUTO: 9 FL (ref 7.2–11.7)
RBC # BLD AUTO: 4.03 MIL/UL (ref 4.4–5.9)
TEARDROP CELLS: SLIGHT
TOTAL CELLS COUNTED BLD: 100
WBC # BLD AUTO: 6.5 K/UL (ref 4.8–10.8)

## 2019-05-24 PROCEDURE — 85025 COMPLETE CBC W/AUTO DIFF WBC: CPT

## 2019-05-24 PROCEDURE — 80053 COMPREHEN METABOLIC PANEL: CPT

## 2019-05-24 PROCEDURE — 83690 ASSAY OF LIPASE: CPT

## 2019-05-24 PROCEDURE — 93005 ELECTROCARDIOGRAM TRACING: CPT

## 2019-05-24 PROCEDURE — 96374 THER/PROPH/DIAG INJ IV PUSH: CPT

## 2019-05-24 PROCEDURE — 71046 X-RAY EXAM CHEST 2 VIEWS: CPT

## 2019-05-24 PROCEDURE — 96375 TX/PRO/DX INJ NEW DRUG ADDON: CPT

## 2019-05-24 PROCEDURE — 84484 ASSAY OF TROPONIN QUANT: CPT

## 2019-05-24 PROCEDURE — 99283 EMERGENCY DEPT VISIT LOW MDM: CPT

## 2019-05-24 PROCEDURE — 96361 HYDRATE IV INFUSION ADD-ON: CPT
